# Patient Record
Sex: FEMALE | Race: WHITE | NOT HISPANIC OR LATINO | URBAN - METROPOLITAN AREA
[De-identification: names, ages, dates, MRNs, and addresses within clinical notes are randomized per-mention and may not be internally consistent; named-entity substitution may affect disease eponyms.]

---

## 2019-09-16 ENCOUNTER — EMERGENCY (EMERGENCY)
Facility: HOSPITAL | Age: 69
LOS: 1 days | Discharge: HOME | End: 2019-09-16
Attending: EMERGENCY MEDICINE | Admitting: EMERGENCY MEDICINE
Payer: MEDICARE

## 2019-09-16 VITALS
SYSTOLIC BLOOD PRESSURE: 139 MMHG | HEART RATE: 69 BPM | DIASTOLIC BLOOD PRESSURE: 84 MMHG | TEMPERATURE: 98 F | RESPIRATION RATE: 18 BRPM | OXYGEN SATURATION: 98 %

## 2019-09-16 VITALS
SYSTOLIC BLOOD PRESSURE: 121 MMHG | HEART RATE: 87 BPM | DIASTOLIC BLOOD PRESSURE: 85 MMHG | TEMPERATURE: 98 F | OXYGEN SATURATION: 98 % | RESPIRATION RATE: 16 BRPM

## 2019-09-16 PROCEDURE — 99283 EMERGENCY DEPT VISIT LOW MDM: CPT | Mod: 25

## 2019-09-16 PROCEDURE — 71101 X-RAY EXAM UNILAT RIBS/CHEST: CPT | Mod: 26,LT

## 2019-09-16 RX ORDER — TRAMADOL HYDROCHLORIDE 50 MG/1
50 TABLET ORAL ONCE
Refills: 0 | Status: DISCONTINUED | OUTPATIENT
Start: 2019-09-16 | End: 2019-09-16

## 2019-09-16 RX ORDER — TETANUS TOXOID, REDUCED DIPHTHERIA TOXOID AND ACELLULAR PERTUSSIS VACCINE, ADSORBED 5; 2.5; 8; 8; 2.5 [IU]/.5ML; [IU]/.5ML; UG/.5ML; UG/.5ML; UG/.5ML
0.5 SUSPENSION INTRAMUSCULAR ONCE
Refills: 0 | Status: COMPLETED | OUTPATIENT
Start: 2019-09-16 | End: 2019-09-16

## 2019-09-16 RX ORDER — IBUPROFEN 200 MG
600 TABLET ORAL ONCE
Refills: 0 | Status: COMPLETED | OUTPATIENT
Start: 2019-09-16 | End: 2019-09-16

## 2019-09-16 RX ORDER — IBUPROFEN 200 MG
1 TABLET ORAL
Qty: 20 | Refills: 0
Start: 2019-09-16 | End: 2019-09-20

## 2019-09-16 RX ORDER — TRAMADOL HYDROCHLORIDE 50 MG/1
1 TABLET ORAL
Qty: 9 | Refills: 0
Start: 2019-09-16 | End: 2019-09-18

## 2019-09-16 RX ADMIN — Medication 600 MILLIGRAM(S): at 13:49

## 2019-09-16 RX ADMIN — TETANUS TOXOID, REDUCED DIPHTHERIA TOXOID AND ACELLULAR PERTUSSIS VACCINE, ADSORBED 0.5 MILLILITER(S): 5; 2.5; 8; 8; 2.5 SUSPENSION INTRAMUSCULAR at 13:50

## 2019-09-16 RX ADMIN — TRAMADOL HYDROCHLORIDE 50 MILLIGRAM(S): 50 TABLET ORAL at 13:49

## 2019-09-16 NOTE — ED ADULT NURSE REASSESSMENT NOTE - NS ED NURSE REASSESS COMMENT FT1
Pt given incentive spirometer as instructed. pt education on use with verbal teach back from pt. Pt also demonstrated correct use of device at this time.

## 2019-09-16 NOTE — ED PROVIDER NOTE - RESPIRATORY, MLM
Breath sounds clear and equal bilaterally. anterior L chest wall tenderness, under breast tissue, no crepitus

## 2019-09-16 NOTE — ED PROVIDER NOTE - PATIENT PORTAL LINK FT
You can access the FollowMyHealth Patient Portal offered by [facility] by registering at the following website: [email or web address]. By joining [employer]’s FollowMyHealth portal, you will also be able to view your health information using other applications (apps) compatible with our system.

## 2019-09-16 NOTE — ED PROVIDER NOTE - OBJECTIVE STATEMENT
70 yo female pt, hx of HTN, hypothyroidism, allergic to sulfa and PNC. Presents after a fall on an uneven pavement. Was able to break her fall with her hands, has some abrasions to L knee and some pain in L anterior chest. States her bra underwire went into her skin. no abrasion/lac noted on exam. now has worsening pain in anterior chest wall w deep breaths. mild pain in knee and hands but normal ROM. last tetanus? no head trauma, no LOC, no neck pain, no back pain, no abd pain.

## 2019-09-16 NOTE — ED PROVIDER NOTE - CLINICAL SUMMARY MEDICAL DECISION MAKING FREE TEXT BOX
Pt with L anterior chest wall pain after fall, pain worse w deep breathing. Will R/O fx and PTX. Provide analgesia, treat clinically as chest wall contusion/rib fx w analgesia and incentive spirometry.

## 2019-09-16 NOTE — ED ADULT TRIAGE NOTE - CHIEF COMPLAINT QUOTE
patient walk in c/o left rib pain s/p fall outside [employer]; broke fall with hands and bruised left knee but really c/o pain to left side; pain with deep breathing; denies hitting head; denies blood thinners or daily ASA

## 2019-09-16 NOTE — ED ADULT NURSE NOTE - OBJECTIVE STATEMENT
68 y/o F c/o L sided rib pain after mechanical fall earlier today. Pn worse with breathing. Pt also c/o B/L hand and L knee pain. 68 y/o F c/o L sided rib pain after mechanical fall earlier today. Pn worse with breathing. Pt also c/o B/L hand and L knee pain. Pt denies hitting head, no visible trauma/no LOC. Pt denies PMH, denies blood thinner use

## 2019-09-16 NOTE — ED ADULT NURSE NOTE - CHPI ED NUR SYMPTOMS NEG
no fever/no numbness/no weakness/no tingling/no vomiting/no deformity/no loss of consciousness no fever/no numbness/no confusion/no vomiting/no tingling/no loss of consciousness/no weakness/no deformity

## 2019-09-16 NOTE — ED PROVIDER NOTE - NSFOLLOWUPINSTRUCTIONS_ED_ALL_ED_FT
TAKE PAIN MEDICATION AS NEEDED.    DO BREATHING EXERCISES WITH THE INCENTIVE SPIROMETER AS OFTEN AS POSSIBLE.     IF YOU DEVELOP WORSENING SHORTNESS OF BREATH, CHEST PAIN OR HAVE ANY OTHER WORSENING SYMPTOMS RETURN TO EMERGENCY ROOM FOR REEVALUATION.

## 2019-09-21 DIAGNOSIS — S60.512A ABRASION OF LEFT HAND, INITIAL ENCOUNTER: ICD-10-CM
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
## 2019-09-21 DIAGNOSIS — R07.89 OTHER CHEST PAIN: ICD-10-CM
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
## 2019-09-21 DIAGNOSIS — R07.81 PLEURODYNIA: ICD-10-CM

## 2019-09-21 DIAGNOSIS — Z23 ENCOUNTER FOR IMMUNIZATION: ICD-10-CM
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
## 2019-09-21 DIAGNOSIS — S80.212A ABRASION, LEFT KNEE, INITIAL ENCOUNTER: ICD-10-CM

## 2023-08-09 ENCOUNTER — INPATIENT (INPATIENT)
Facility: HOSPITAL | Age: 73
LOS: 2 days | Discharge: HOME | DRG: 522 | End: 2023-08-12
Attending: ORTHOPAEDIC SURGERY | Admitting: ORTHOPAEDIC SURGERY
Payer: MEDICARE

## 2023-08-09 VITALS
HEIGHT: 66 IN | DIASTOLIC BLOOD PRESSURE: 81 MMHG | TEMPERATURE: 98 F | WEIGHT: 115.08 LBS | HEART RATE: 90 BPM | SYSTOLIC BLOOD PRESSURE: 131 MMHG | RESPIRATION RATE: 18 BRPM | OXYGEN SATURATION: 96 %

## 2023-08-09 DIAGNOSIS — I10 ESSENTIAL (PRIMARY) HYPERTENSION: ICD-10-CM

## 2023-08-09 DIAGNOSIS — E03.9 HYPOTHYROIDISM, UNSPECIFIED: ICD-10-CM

## 2023-08-09 DIAGNOSIS — E78.5 HYPERLIPIDEMIA, UNSPECIFIED: ICD-10-CM

## 2023-08-09 DIAGNOSIS — F32.9 MAJOR DEPRESSIVE DISORDER, SINGLE EPISODE, UNSPECIFIED: ICD-10-CM

## 2023-08-09 DIAGNOSIS — S72.002A FRACTURE OF UNSPECIFIED PART OF NECK OF LEFT FEMUR, INITIAL ENCOUNTER FOR CLOSED FRACTURE: ICD-10-CM

## 2023-08-09 LAB
ALBUMIN SERPL ELPH-MCNC: 3.1 G/DL — LOW (ref 3.4–5)
ALP SERPL-CCNC: 132 U/L — HIGH (ref 40–120)
ALT FLD-CCNC: 28 U/L — SIGNIFICANT CHANGE UP (ref 12–42)
ANION GAP SERPL CALC-SCNC: 11 MMOL/L — SIGNIFICANT CHANGE UP (ref 9–16)
APTT BLD: 31.6 SEC — SIGNIFICANT CHANGE UP (ref 24.5–35.6)
AST SERPL-CCNC: 46 U/L — HIGH (ref 15–37)
BASOPHILS # BLD AUTO: 0.06 K/UL — SIGNIFICANT CHANGE UP (ref 0–0.2)
BASOPHILS NFR BLD AUTO: 0.8 % — SIGNIFICANT CHANGE UP (ref 0–2)
BILIRUB SERPL-MCNC: 0.5 MG/DL — SIGNIFICANT CHANGE UP (ref 0.2–1.2)
BUN SERPL-MCNC: 15 MG/DL — SIGNIFICANT CHANGE UP (ref 7–23)
CALCIUM SERPL-MCNC: 9.3 MG/DL — SIGNIFICANT CHANGE UP (ref 8.5–10.5)
CHLORIDE SERPL-SCNC: 98 MMOL/L — SIGNIFICANT CHANGE UP (ref 96–108)
CO2 SERPL-SCNC: 29 MMOL/L — SIGNIFICANT CHANGE UP (ref 22–31)
CREAT SERPL-MCNC: 0.48 MG/DL — LOW (ref 0.5–1.3)
EGFR: 101 ML/MIN/1.73M2 — SIGNIFICANT CHANGE UP
EOSINOPHIL # BLD AUTO: 0.03 K/UL — SIGNIFICANT CHANGE UP (ref 0–0.5)
EOSINOPHIL NFR BLD AUTO: 0.4 % — SIGNIFICANT CHANGE UP (ref 0–6)
GLUCOSE SERPL-MCNC: 97 MG/DL — SIGNIFICANT CHANGE UP (ref 70–99)
HCT VFR BLD CALC: 41.1 % — SIGNIFICANT CHANGE UP (ref 34.5–45)
HGB BLD-MCNC: 14 G/DL — SIGNIFICANT CHANGE UP (ref 11.5–15.5)
IMM GRANULOCYTES NFR BLD AUTO: 0.3 % — SIGNIFICANT CHANGE UP (ref 0–0.9)
INR BLD: 0.88 — SIGNIFICANT CHANGE UP (ref 0.85–1.18)
LYMPHOCYTES # BLD AUTO: 1.3 K/UL — SIGNIFICANT CHANGE UP (ref 1–3.3)
LYMPHOCYTES # BLD AUTO: 16.4 % — SIGNIFICANT CHANGE UP (ref 13–44)
MCHC RBC-ENTMCNC: 34.1 GM/DL — SIGNIFICANT CHANGE UP (ref 32–36)
MCHC RBC-ENTMCNC: 34.3 PG — HIGH (ref 27–34)
MCV RBC AUTO: 100.7 FL — HIGH (ref 80–100)
MONOCYTES # BLD AUTO: 0.72 K/UL — SIGNIFICANT CHANGE UP (ref 0–0.9)
MONOCYTES NFR BLD AUTO: 9.1 % — SIGNIFICANT CHANGE UP (ref 2–14)
NEUTROPHILS # BLD AUTO: 5.79 K/UL — SIGNIFICANT CHANGE UP (ref 1.8–7.4)
NEUTROPHILS NFR BLD AUTO: 73 % — SIGNIFICANT CHANGE UP (ref 43–77)
NRBC # BLD: 0 /100 WBCS — SIGNIFICANT CHANGE UP (ref 0–0)
PLATELET # BLD AUTO: 333 K/UL — SIGNIFICANT CHANGE UP (ref 150–400)
POTASSIUM SERPL-MCNC: 3 MMOL/L — LOW (ref 3.5–5.3)
POTASSIUM SERPL-SCNC: 3 MMOL/L — LOW (ref 3.5–5.3)
PROT SERPL-MCNC: 7 G/DL — SIGNIFICANT CHANGE UP (ref 6.4–8.2)
PROTHROM AB SERPL-ACNC: 10 SEC — SIGNIFICANT CHANGE UP (ref 9.5–13)
RBC # BLD: 4.08 M/UL — SIGNIFICANT CHANGE UP (ref 3.8–5.2)
RBC # FLD: 12.1 % — SIGNIFICANT CHANGE UP (ref 10.3–14.5)
SARS-COV-2 RNA SPEC QL NAA+PROBE: SIGNIFICANT CHANGE UP
SODIUM SERPL-SCNC: 138 MMOL/L — SIGNIFICANT CHANGE UP (ref 132–145)
WBC # BLD: 7.92 K/UL — SIGNIFICANT CHANGE UP (ref 3.8–10.5)
WBC # FLD AUTO: 7.92 K/UL — SIGNIFICANT CHANGE UP (ref 3.8–10.5)

## 2023-08-09 PROCEDURE — 73502 X-RAY EXAM HIP UNI 2-3 VIEWS: CPT | Mod: 26,LT

## 2023-08-09 PROCEDURE — 71045 X-RAY EXAM CHEST 1 VIEW: CPT | Mod: 26

## 2023-08-09 PROCEDURE — 99285 EMERGENCY DEPT VISIT HI MDM: CPT

## 2023-08-09 PROCEDURE — 73700 CT LOWER EXTREMITY W/O DYE: CPT | Mod: 26,LT

## 2023-08-09 PROCEDURE — 76856 US EXAM PELVIC COMPLETE: CPT | Mod: 26

## 2023-08-09 RX ORDER — POVIDONE-IODINE 5 %
1 AEROSOL (ML) TOPICAL ONCE
Refills: 0 | Status: DISCONTINUED | OUTPATIENT
Start: 2023-08-09 | End: 2023-08-11

## 2023-08-09 RX ORDER — ATORVASTATIN CALCIUM 80 MG/1
20 TABLET, FILM COATED ORAL AT BEDTIME
Refills: 0 | Status: DISCONTINUED | OUTPATIENT
Start: 2023-08-09 | End: 2023-08-11

## 2023-08-09 RX ORDER — ZOLPIDEM TARTRATE 10 MG/1
1 TABLET ORAL
Refills: 0 | DISCHARGE

## 2023-08-09 RX ORDER — LEVOTHYROXINE SODIUM 125 MCG
50 TABLET ORAL DAILY
Refills: 0 | Status: DISCONTINUED | OUTPATIENT
Start: 2023-08-09 | End: 2023-08-11

## 2023-08-09 RX ORDER — ZALEPLON 10 MG
5 CAPSULE ORAL AT BEDTIME
Refills: 0 | Status: DISCONTINUED | OUTPATIENT
Start: 2023-08-09 | End: 2023-08-11

## 2023-08-09 RX ORDER — SENNA PLUS 8.6 MG/1
2 TABLET ORAL AT BEDTIME
Refills: 0 | Status: DISCONTINUED | OUTPATIENT
Start: 2023-08-09 | End: 2023-08-11

## 2023-08-09 RX ORDER — LEVETIRACETAM 250 MG/1
1 TABLET, FILM COATED ORAL
Refills: 0 | DISCHARGE

## 2023-08-09 RX ORDER — GABAPENTIN 400 MG/1
1 CAPSULE ORAL
Refills: 0 | DISCHARGE

## 2023-08-09 RX ORDER — AMLODIPINE BESYLATE 2.5 MG/1
1 TABLET ORAL
Refills: 0 | DISCHARGE

## 2023-08-09 RX ORDER — HYDROMORPHONE HYDROCHLORIDE 2 MG/ML
0.5 INJECTION INTRAMUSCULAR; INTRAVENOUS; SUBCUTANEOUS EVERY 6 HOURS
Refills: 0 | Status: DISCONTINUED | OUTPATIENT
Start: 2023-08-09 | End: 2023-08-11

## 2023-08-09 RX ORDER — LEVETIRACETAM 250 MG/1
250 TABLET, FILM COATED ORAL
Refills: 0 | Status: DISCONTINUED | OUTPATIENT
Start: 2023-08-09 | End: 2023-08-10

## 2023-08-09 RX ORDER — OXYCODONE HYDROCHLORIDE 5 MG/1
10 TABLET ORAL EVERY 4 HOURS
Refills: 0 | Status: DISCONTINUED | OUTPATIENT
Start: 2023-08-09 | End: 2023-08-11

## 2023-08-09 RX ORDER — SERTRALINE 25 MG/1
1 TABLET, FILM COATED ORAL
Refills: 0 | DISCHARGE

## 2023-08-09 RX ORDER — ROSUVASTATIN CALCIUM 5 MG/1
1 TABLET ORAL
Refills: 0 | DISCHARGE

## 2023-08-09 RX ORDER — OXYCODONE HYDROCHLORIDE 5 MG/1
5 TABLET ORAL EVERY 4 HOURS
Refills: 0 | Status: DISCONTINUED | OUTPATIENT
Start: 2023-08-09 | End: 2023-08-11

## 2023-08-09 RX ORDER — SODIUM CHLORIDE 9 MG/ML
500 INJECTION INTRAMUSCULAR; INTRAVENOUS; SUBCUTANEOUS ONCE
Refills: 0 | Status: COMPLETED | OUTPATIENT
Start: 2023-08-09 | End: 2023-08-09

## 2023-08-09 RX ORDER — POLYETHYLENE GLYCOL 3350 17 G/17G
17 POWDER, FOR SOLUTION ORAL DAILY
Refills: 0 | Status: DISCONTINUED | OUTPATIENT
Start: 2023-08-09 | End: 2023-08-11

## 2023-08-09 RX ORDER — CHLORHEXIDINE GLUCONATE 213 G/1000ML
1 SOLUTION TOPICAL ONCE
Refills: 0 | Status: DISCONTINUED | OUTPATIENT
Start: 2023-08-09 | End: 2023-08-11

## 2023-08-09 RX ORDER — SERTRALINE 25 MG/1
25 TABLET, FILM COATED ORAL DAILY
Refills: 0 | Status: DISCONTINUED | OUTPATIENT
Start: 2023-08-09 | End: 2023-08-10

## 2023-08-09 RX ORDER — ENOXAPARIN SODIUM 100 MG/ML
30 INJECTION SUBCUTANEOUS ONCE
Refills: 0 | Status: COMPLETED | OUTPATIENT
Start: 2023-08-09 | End: 2023-08-09

## 2023-08-09 RX ORDER — SODIUM CHLORIDE 9 MG/ML
1000 INJECTION INTRAMUSCULAR; INTRAVENOUS; SUBCUTANEOUS
Refills: 0 | Status: DISCONTINUED | OUTPATIENT
Start: 2023-08-09 | End: 2023-08-11

## 2023-08-09 RX ORDER — PANTOPRAZOLE SODIUM 20 MG/1
40 TABLET, DELAYED RELEASE ORAL
Refills: 0 | Status: DISCONTINUED | OUTPATIENT
Start: 2023-08-09 | End: 2023-08-11

## 2023-08-09 RX ORDER — AMLODIPINE BESYLATE 2.5 MG/1
5 TABLET ORAL DAILY
Refills: 0 | Status: DISCONTINUED | OUTPATIENT
Start: 2023-08-09 | End: 2023-08-11

## 2023-08-09 RX ORDER — GABAPENTIN 400 MG/1
300 CAPSULE ORAL DAILY
Refills: 0 | Status: DISCONTINUED | OUTPATIENT
Start: 2023-08-09 | End: 2023-08-11

## 2023-08-09 RX ORDER — ACETAMINOPHEN 500 MG
650 TABLET ORAL ONCE
Refills: 0 | Status: COMPLETED | OUTPATIENT
Start: 2023-08-09 | End: 2023-08-09

## 2023-08-09 RX ADMIN — Medication 650 MILLIGRAM(S): at 12:09

## 2023-08-09 RX ADMIN — Medication 5 MILLIGRAM(S): at 23:13

## 2023-08-09 RX ADMIN — OXYCODONE HYDROCHLORIDE 5 MILLIGRAM(S): 5 TABLET ORAL at 23:10

## 2023-08-09 RX ADMIN — ENOXAPARIN SODIUM 30 MILLIGRAM(S): 100 INJECTION SUBCUTANEOUS at 22:35

## 2023-08-09 RX ADMIN — OXYCODONE HYDROCHLORIDE 5 MILLIGRAM(S): 5 TABLET ORAL at 22:35

## 2023-08-09 NOTE — ED PROVIDER NOTE - PHYSICAL EXAMINATION
VITAL SIGNS: I have reviewed nursing notes and confirm.  CONST: Well-appearing; No apparent distress.  ENT: No nasal discharge; airway clear.  EYES: Sclera clear. Pupils round and symmetrical bilaterally.  RESP: Breathing comfortably; speaking in full sentences.   MSK: +Some tenderness of the left hip joint but pelvis is stable. Decreased ROM of left hip.    NEURO: Alert, oriented. Speech is fluent and appropriate. Moving all extremities appropriately. No gross motor or sensory abnormalities.  SKIN: Skin is normal temperature; no diaphoresis; no pallor.  PSYCH: Cooperative. Appropriate mood, language, and behavior.

## 2023-08-09 NOTE — ED ADULT NURSE NOTE - NSFALLRISKINTERV_ED_ALL_ED

## 2023-08-09 NOTE — CONSULT NOTE ADULT - ASSESSMENT
72yo F w/ PMH HTN, HLD, depression, hypothyroidism, seizure hx?, alcohol use (1 drink a night) who presented for L hip pain and known L femoral fracture. Admitted to Orthopedic service for Closed reduction percutaneous pinning vs Total hip arthroplasty.  Internal Medicine consulted for preoperative assessment.        ***INCOMPLETE***    #preop assessment  RCRI 0 points (class 1)  Marroquin score 0.3%  admission ECG: left axis deviation, anterior lead T wave inversions.  No hx of chest pain, TALLEY. No hx of SURI.  METS prior to hip pain >4. Over last several months, activity significantly limited due to pain.  No past allergies to anesthetics.  Hx of 3 falls since March. Had EEG study; pt not sure if seizure actually seen but her PCP (Reshma Lorenzo at [facility]) reportedly thought it warranted starting keppra which patient has no yet started.  -        ***INCOMPLETE*** 72yo F w/ PMH HTN, HLD, depression, hypothyroidism, seizure hx?, alcohol use (1 drink a night) who presented for L hip pain and known L femoral fracture. Admitted to Orthopedic service for Closed reduction percutaneous pinning vs Total hip arthroplasty.  Internal Medicine consulted for preoperative assessment.      #preop assessment  RCRI 0 points (class 1)  Marroquin score 0.3%  admission ECG: left axis deviation, anterior lead T wave inversions.  No hx of chest pain, TALLEY. No hx of SURI.  METS prior to hip pain >4. Over last several months, activity significantly limited due to hip pain.  No past allergies to anesthetics.  Hx of 3 falls since March. PCP thinks 1 fall due to seizure but not concerned for cardiogenic syncope. Had EEG study; pt not sure if seizure actually seen but her PCP (Reshma Lorenzo at [place]) reportedly thought it warranted starting keppra which patient has no yet started.  Recs:  - pt is of low perioperative cardiac risk for low risk procedure  - no further interventions needed prior to procedure        Discussed with Dr. Pinto.  Not final until signed by Attending.

## 2023-08-09 NOTE — ED PROVIDER NOTE - OBJECTIVE STATEMENT
72 y/o F with PMHx of arthritis diagnosed 6 months ago and being considered for left hip replacement, states she fell in her house 2 nights ago and now complaining of left hip pain and unable to bear weight. She reports she has had 3 falls in the last 4 months and has seen a neurologist for this who did an EEG and she will be starting a new medication today. She takes Tylenol daily for arthritis. No head strike or LOC.

## 2023-08-09 NOTE — H&P ADULT - HISTORY OF PRESENT ILLNESS
Orthopaedic Surgery Consult Note    For Surgeon: Dr. Garcia    HPI:  73yFemale with PMH of hypothyroidism, depression, hld, htn, pos seizures  presents with L hip pain after mechanical fall. Was immediately unable to bear weight on that limb. In ED, x-rays showed Garden I femoral neck fracture. Denies any numbness or tingling. Denies injury elswhere.    States she has had long standing anterior groin pain since february follows with Dr. hernandez at [facility] for l hip OA    HPI:      Vital Signs Last 24 Hrs  T(C): 36.3 (09 Aug 2023 20:14), Max: 36.9 (09 Aug 2023 13:21)  T(F): 97.4 (09 Aug 2023 20:14), Max: 98.4 (09 Aug 2023 13:21)  HR: 81 (09 Aug 2023 20:14) (81 - 90)  BP: 137/80 (09 Aug 2023 20:14) (131/81 - 149/82)  BP(mean): --  RR: 17 (09 Aug 2023 20:14) (16 - 18)  SpO2: 96% (09 Aug 2023 20:14) (95% - 96%)    Parameters below as of 09 Aug 2023 20:14  Patient On (Oxygen Delivery Method): room air        Physical Exam:  General: NAD, resting comfortably in bed  [L]LE:  Skin intact, no ecchymosis or wounds. Limb shortened and externally rotated.  5/5 EHL/FHL/TA/GS  SILT over distal limb  DP/PT palpable    Imaging:  X-ray [L] hip - Garden [i] femoral neck fracture, nondisplaced, posteriot ilt <5, valgus impacted, underlying OA w/ some AVN

## 2023-08-09 NOTE — ED ADULT NURSE NOTE - OBJECTIVE STATEMENT
Patient presents with complaints of worsening left hip pain and difficulty ambulating s/p fall on Sunday night. Patient unable to bear weight afterward. Has been using wheelchair since then. H/o multiple falls (3 falls within 4 months) and arthritis, planning for hip replacement. Didn't hit her head during fall.

## 2023-08-09 NOTE — ED PROVIDER NOTE - PROGRESS NOTE DETAILS
Imaging shows L hip fracture.  Pt does NOT want to stay in the hospital because she wants to get a hip replacement as an outpatient.  Discussed high morbidity and mortality associated with delayed repair of hip fracture with pt.  Discussed w pt's orthopedist Dr. Noel at [facility] who agrees pt needs to stay for repair.    Imaging also shows large R adnexal cyst, of which pt is aware and has plans to get an US via her OBGYN.  Will get pelvic US here- only transabdominal as pt will be unable to place feet in stirrups due to hip fracture. Discussed w pt's PMD Dr. Lorenzo ([phone]) who agrees w plan for pt to stay for hip fracture repair.  Plan to admit to ortho at .  Pt amenable to plan.

## 2023-08-09 NOTE — CONSULT NOTE ADULT - SUBJECTIVE AND OBJECTIVE BOX
INTERNAL MEDICINE - INITIAL CONSULT NOTE    MARIA ESTHER BARRERA  [id number]    Patient is a 73y old  Female who presents with a chief complaint of     HPI:  Orthopaedic Surgery Consult Note    For Surgeon: Dr. Garcia    HPI:  73yFemale with PMH of hypothyroidism, depression, hld, htn, pos seizures  presents with L hip pain after mechanical fall. Was immediately unable to bear weight on that limb. In ED, x-rays showed Garden I femoral neck fracture. Denies any numbness or tingling. Denies injury elswhere.    States she has had long standing anterior groin pain since february [facility]    HPI:      Vital Signs Last 24 Hrs  T(C): 36.3 (09 Aug 2023 20:14), Max: 36.9 (09 Aug 2023 13:21)  T(F): 97.4 (09 Aug 2023 20:14), Max: 98.4 (09 Aug 2023 13:21)  HR: 81 (09 Aug 2023 20:14) (81 - 90)  BP: 137/80 (09 Aug 2023 20:14) (131/81 - 149/82)  BP(mean): --  RR: 17 (09 Aug 2023 20:14) (16 - 18)  SpO2: 96% (09 Aug 2023 20:14) (95% - 96%)    Parameters below as of 09 Aug 2023 20:14  Patient On (Oxygen Delivery Method): room air        Physical Exam:  General: NAD, resting comfortably in bed  [L]LE:  Skin intact, no ecchymosis or wounds. Limb shortened and externally rotated.  5/5 EHL/FHL/TA/GS  SILT over distal limb  DP/PT palpable    Imaging:  X-ray [L] hip - Garden [i] femoral neck fracture, nondisplaced, posteriot ilt <5, valgus impacted, underlying OA w/ some AVN   (09 Aug 2023 21:21)      PAST MEDICAL & SURGICAL HISTORY:  Arthritis      Hypothyroidism      Hypertension      Hyperlipidemia      Major depression          amLODIPine   Tablet 5 milliGRAM(s) Oral daily  atorvastatin 20 milliGRAM(s) Oral at bedtime  chlorhexidine 2% Cloths 1 Application(s) Topical once  gabapentin 300 milliGRAM(s) Oral daily  HYDROmorphone  Injectable 0.5 milliGRAM(s) IV Push every 6 hours PRN  levETIRAcetam 250 milliGRAM(s) Oral two times a day  levothyroxine 50 MICROGram(s) Oral daily  multivitamin 1 Tablet(s) Oral daily  oxyCODONE    IR 5 milliGRAM(s) Oral every 4 hours PRN  oxyCODONE    IR 10 milliGRAM(s) Oral every 4 hours PRN  pantoprazole    Tablet 40 milliGRAM(s) Oral before breakfast  polyethylene glycol 3350 17 Gram(s) Oral daily  povidone iodine 5% Nasal Swab 1 Application(s) Both Nostrils once  senna 2 Tablet(s) Oral at bedtime  sertraline 25 milliGRAM(s) Oral daily  sodium chloride 0.9%. 1000 milliLiter(s) IV Continuous <Continuous>  zaleplon 5 milliGRAM(s) Oral at bedtime PRN      FAMILY HISTORY:      REVIEW OF SYSTEMS:  CONSTITUTIONAL: No weakness, fever, or chills  EYES: No eye pain or discharge  ENMT:  No sinus or throat pain  NECK: No pain or stiffness  RESPIRATORY: No cough, wheezing, chills or hemoptysis; No shortness of breath  CARDIOVASCULAR: No chest pain, palpitations, dizziness, or leg swelling  GASTROINTESTINAL: No abdominal or epigastric pain. No nausea, vomiting, or hematemesis; No diarrhea or constipation. No melena or hematochezia.  GENITOURINARY: No dysuria or incontinence  NEUROLOGICAL: No headaches, memory loss, loss of strength, numbness, or tremors  SKIN: No new rashes  MUSCULOSKELETAL: No joint pain or swelling; No muscle, back, or extremity pain  HEME/LYMPH: No easy bruising, or bleeding gums      PHYSICAL EXAM:  T(C): 36.3 (08-09-23 @ 20:14), Max: 36.9 (08-09-23 @ 13:21)  HR: 81 (08-09-23 @ 20:14) (81 - 90)  BP: 137/80 (08-09-23 @ 20:14) (131/81 - 149/82)  RR: 17 (08-09-23 @ 20:14) (16 - 18)  SpO2: 96% (08-09-23 @ 20:14) (95% - 96%)    General: NAD, laying in bed, speaking in full sentences  HEENT: head NC/AT, no conjunctival injection, EOMI, MMM  Neck: supple, no JVD  Cardio: RRR, +S1/S2, no M/R/G  Resp: lungs CTAB, no cough/wheezes/rales/rhonchi  Abdo: soft, NT, ND, +bowel sounds x4, no organomegaly or palpable mass    Extremities: WWP, no edema/cyanosis/clubbing   Vasc: 2+ radial and DP pulses b/l  Neuro: A&Ox3  Psych: speech non-pressured, thoughts goal-oriented  Skin: dry, intact, no visible jaundice   MSK: no joint swelling      Consultant(s) Notes Reviewed:  [x ] YES  [ ] NO  Care Discussed with Consultants/Other Providers [ x] YES  [ ] NO    LABS:          RADIOLOGY & ADDITIONAL TESTS:    Imaging Personally Reviewed:  [X] YES  [ ] NO INTERNAL MEDICINE - INITIAL CONSULT NOTE    HOLLYMARIA ESTHER  [id number]    Patient is a 73y old  Female who presents with a chief complaint of     HPI:  Orthopaedic Surgery Consult Note    For Surgeon: Dr. Garcia    HPI:  73yFemale with PMH of hypothyroidism, depression, hld, htn, pos seizures  presents with L hip pain after mechanical fall. Was immediately unable to bear weight on that limb. In ED, x-rays showed Garden I femoral neck fracture. Denies any numbness or tingling. Denies injury elswhere.    States she has had long standing anterior groin pain since february follows with Dr. hernandez at \[facility]\"" for l hip OA          Imaging:  X-ray [L] hip - Garden [i] femoral neck fracture, nondisplaced, posteriot ilt <5, valgus impacted, underlying OA w/ some AVN   (09 Aug 2023 21:21)      PAST MEDICAL & SURGICAL HISTORY:  Arthritis      Hypothyroidism      Hypertension      Hyperlipidemia      Major depression          amLODIPine   Tablet 5 milliGRAM(s) Oral daily  atorvastatin 20 milliGRAM(s) Oral at bedtime  chlorhexidine 2% Cloths 1 Application(s) Topical once  gabapentin 300 milliGRAM(s) Oral daily  HYDROmorphone  Injectable 0.5 milliGRAM(s) IV Push every 6 hours PRN  levETIRAcetam 250 milliGRAM(s) Oral two times a day  levothyroxine 50 MICROGram(s) Oral daily  multivitamin 1 Tablet(s) Oral daily  oxyCODONE    IR 5 milliGRAM(s) Oral every 4 hours PRN  oxyCODONE    IR 10 milliGRAM(s) Oral every 4 hours PRN  pantoprazole    Tablet 40 milliGRAM(s) Oral before breakfast  polyethylene glycol 3350 17 Gram(s) Oral daily  povidone iodine 5% Nasal Swab 1 Application(s) Both Nostrils once  senna 2 Tablet(s) Oral at bedtime  sertraline 25 milliGRAM(s) Oral daily  sodium chloride 0.9%. 1000 milliLiter(s) IV Continuous <Continuous>  zaleplon 5 milliGRAM(s) Oral at bedtime PRN      FAMILY HISTORY:      REVIEW OF SYSTEMS:  CONSTITUTIONAL: No weakness, fever, or chills  EYES: No eye pain or discharge  ENMT:  No sinus or throat pain  NECK: No pain or stiffness  RESPIRATORY: No cough, wheezing, chills or hemoptysis; No shortness of breath  CARDIOVASCULAR: No chest pain, palpitations, dizziness, or leg swelling  GASTROINTESTINAL: No abdominal or epigastric pain. No nausea, vomiting, or hematemesis; No diarrhea or constipation. No melena or hematochezia.  GENITOURINARY: No dysuria or incontinence  NEUROLOGICAL: No headaches, memory loss, loss of strength, numbness, or tremors  SKIN: No new rashes  MUSCULOSKELETAL: No joint pain or swelling; No muscle, back, or extremity pain  HEME/LYMPH: No easy bruising, or bleeding gums      PHYSICAL EXAM:  T(C): 36.3 (08-09-23 @ 20:14), Max: 36.9 (08-09-23 @ 13:21)  HR: 81 (08-09-23 @ 20:14) (81 - 90)  BP: 137/80 (08-09-23 @ 20:14) (131/81 - 149/82)  RR: 17 (08-09-23 @ 20:14) (16 - 18)  SpO2: 96% (08-09-23 @ 20:14) (95% - 96%)    General: NAD, sitting in bed  HEENT: head NC/AT, no conjunctival injection, EOMI, MMM  Neck: supple, no JVD  Cardio: RRR, +S1/S2, no M/R/G  Resp: lungs CTAB, no cough/wheezes/rales/rhonchi  Abdo: soft, NT, ND, +bowel sounds x4, no organomegaly or palpable mass    Extremities: warm and dry, no edema/cyanosis/clubbing   Vasc: 2+ radial and 1+ DP pulses b/l  Neuro: A&Ox3, decreased ROM L leg  Psych: calm, appropriate  Skin: dry, intact  MSK: no joint swelling      Consultant(s) Notes Reviewed:  [x ] YES  [ ] NO  Care Discussed with Consultants/Other Providers [ x] YES  [ ] NO    LABS:          RADIOLOGY & ADDITIONAL TESTS:    Imaging Personally Reviewed:  [X] YES  [ ] NO

## 2023-08-09 NOTE — H&P ADULT - ASSESSMENT
A/P: 73yFemale presents with [L] hip femoral neckfracture following mechanical fall.  -Added on to OR schedule for [CRPP vs GEOFFREY]  -Consented for [x] hip pinning vs. hemiarthroplasty vs. total hip arthroplasty  -preop  -med clearance  -strict bedrest  -NPO midnight/IVF  -Medicine consulted for pre-op clearance  -Pre-op workup- CBC, BMP, PT/PTT/INR, T&S, UA, EKG, CXR, COVID  -Obtain low AP pelvis film  -Pain control  -NPO/IVF  -Discussed with  []    Ortho Pager [phone]

## 2023-08-09 NOTE — ED ADULT TRIAGE NOTE - CHIEF COMPLAINT QUOTE
Pt comes in via wheelchair c/o L hip pain s/p mechanical fall. Pt is supposed to have L hip replacement in near future.

## 2023-08-09 NOTE — ED PROVIDER NOTE - CLINICAL SUMMARY MEDICAL DECISION MAKING FREE TEXT BOX
Patient with Hx of arthritis and currently being worked up for frequent falls by neurology presents with left hip pain after fall 2 days ago with difficulty bearing weight. Patient has tenderness of left hip with decreased range of motion on exam. Differential includes exacerbation of arthritis pain versus fracture. Plan for x-ray, analgesia, and reassess.

## 2023-08-09 NOTE — H&P ADULT - NSICDXPASTMEDICALHX_GEN_ALL_CORE_FT
PAST MEDICAL HISTORY:  Arthritis     Hyperlipidemia     Hypertension     Hypothyroidism     Major depression

## 2023-08-10 LAB
ANION GAP SERPL CALC-SCNC: 12 MMOL/L — SIGNIFICANT CHANGE UP (ref 5–17)
APPEARANCE UR: CLEAR — SIGNIFICANT CHANGE UP
BACTERIA # UR AUTO: PRESENT /HPF
BILIRUB UR-MCNC: ABNORMAL
BLD GP AB SCN SERPL QL: NEGATIVE — SIGNIFICANT CHANGE UP
BLD GP AB SCN SERPL QL: NEGATIVE — SIGNIFICANT CHANGE UP
BUN SERPL-MCNC: 6 MG/DL — LOW (ref 7–23)
CALCIUM SERPL-MCNC: 8.4 MG/DL — SIGNIFICANT CHANGE UP (ref 8.4–10.5)
CALCIUM SERPL-MCNC: 8.6 MG/DL — SIGNIFICANT CHANGE UP (ref 8.4–10.5)
CHLORIDE SERPL-SCNC: 96 MMOL/L — SIGNIFICANT CHANGE UP (ref 96–108)
CHLORIDE SERPL-SCNC: 98 MMOL/L — SIGNIFICANT CHANGE UP (ref 96–108)
CO2 SERPL-SCNC: 24 MMOL/L — SIGNIFICANT CHANGE UP (ref 22–31)
CO2 SERPL-SCNC: 26 MMOL/L — SIGNIFICANT CHANGE UP (ref 22–31)
COLOR SPEC: YELLOW — SIGNIFICANT CHANGE UP
COMMENT - URINE: SIGNIFICANT CHANGE UP
CREAT SERPL-MCNC: 0.38 MG/DL — LOW (ref 0.5–1.3)
DIFF PNL FLD: ABNORMAL
EGFR: 107 ML/MIN/1.73M2 — SIGNIFICANT CHANGE UP
EPI CELLS # UR: ABNORMAL /HPF (ref 0–5)
GLUCOSE SERPL-MCNC: 88 MG/DL — SIGNIFICANT CHANGE UP (ref 70–99)
GLUCOSE SERPL-MCNC: 98 MG/DL — SIGNIFICANT CHANGE UP (ref 70–99)
GLUCOSE UR QL: NEGATIVE — SIGNIFICANT CHANGE UP
HCT VFR BLD CALC: 37 % — SIGNIFICANT CHANGE UP (ref 34.5–45)
HCV AB S/CO SERPL IA: 0.04 S/CO — SIGNIFICANT CHANGE UP
HCV AB SERPL-IMP: SIGNIFICANT CHANGE UP
HGB BLD-MCNC: 12.7 G/DL — SIGNIFICANT CHANGE UP (ref 11.5–15.5)
KETONES UR-MCNC: >=80 MG/DL
LEUKOCYTE ESTERASE UR-ACNC: NEGATIVE — SIGNIFICANT CHANGE UP
MAGNESIUM SERPL-MCNC: 1.8 MG/DL — SIGNIFICANT CHANGE UP (ref 1.6–2.6)
MCHC RBC-ENTMCNC: 34.3 GM/DL — SIGNIFICANT CHANGE UP (ref 32–36)
MCHC RBC-ENTMCNC: 35 PG — HIGH (ref 27–34)
MCV RBC AUTO: 101.9 FL — HIGH (ref 80–100)
NITRITE UR-MCNC: NEGATIVE — SIGNIFICANT CHANGE UP
NRBC # BLD: 0 /100 WBCS — SIGNIFICANT CHANGE UP (ref 0–0)
PH UR: 6 — SIGNIFICANT CHANGE UP (ref 5–8)
PLATELET # BLD AUTO: 290 K/UL — SIGNIFICANT CHANGE UP (ref 150–400)
POTASSIUM SERPL-MCNC: 2.7 MMOL/L — CRITICAL LOW (ref 3.5–5.3)
POTASSIUM SERPL-SCNC: 2.7 MMOL/L — CRITICAL LOW (ref 3.5–5.3)
PROT UR-MCNC: ABNORMAL MG/DL
RBC # BLD: 3.63 M/UL — LOW (ref 3.8–5.2)
RBC # FLD: 12 % — SIGNIFICANT CHANGE UP (ref 10.3–14.5)
RBC CASTS # UR COMP ASSIST: < 5 /HPF — SIGNIFICANT CHANGE UP
RH IG SCN BLD-IMP: POSITIVE — SIGNIFICANT CHANGE UP
RH IG SCN BLD-IMP: POSITIVE — SIGNIFICANT CHANGE UP
SODIUM SERPL-SCNC: 134 MMOL/L — LOW (ref 135–145)
SP GR SPEC: 1.02 — SIGNIFICANT CHANGE UP (ref 1–1.03)
T4 AB SER-ACNC: 6.87 UG/DL — SIGNIFICANT CHANGE UP (ref 4.5–11.7)
TSH SERPL-MCNC: 5.24 UIU/ML — HIGH (ref 0.27–4.2)
UROBILINOGEN FLD QL: 0.2 E.U./DL — SIGNIFICANT CHANGE UP
WBC # BLD: 6.84 K/UL — SIGNIFICANT CHANGE UP (ref 3.8–10.5)
WBC # FLD AUTO: 6.84 K/UL — SIGNIFICANT CHANGE UP (ref 3.8–10.5)
WBC UR QL: < 5 /HPF — SIGNIFICANT CHANGE UP

## 2023-08-10 PROCEDURE — 72170 X-RAY EXAM OF PELVIS: CPT | Mod: 26

## 2023-08-10 PROCEDURE — 99233 SBSQ HOSP IP/OBS HIGH 50: CPT

## 2023-08-10 RX ORDER — LEVETIRACETAM 250 MG/1
750 TABLET, FILM COATED ORAL
Refills: 0 | Status: DISCONTINUED | OUTPATIENT
Start: 2023-08-10 | End: 2023-08-11

## 2023-08-10 RX ORDER — POTASSIUM CHLORIDE 20 MEQ
20 PACKET (EA) ORAL
Refills: 0 | Status: COMPLETED | OUTPATIENT
Start: 2023-08-10 | End: 2023-08-10

## 2023-08-10 RX ORDER — ZALEPLON 10 MG
5 CAPSULE ORAL AT BEDTIME
Refills: 0 | Status: DISCONTINUED | OUTPATIENT
Start: 2023-08-10 | End: 2023-08-11

## 2023-08-10 RX ORDER — ENOXAPARIN SODIUM 100 MG/ML
40 INJECTION SUBCUTANEOUS ONCE
Refills: 0 | Status: COMPLETED | OUTPATIENT
Start: 2023-08-10 | End: 2023-08-10

## 2023-08-10 RX ORDER — LEVETIRACETAM 250 MG/1
1500 TABLET, FILM COATED ORAL ONCE
Refills: 0 | Status: COMPLETED | OUTPATIENT
Start: 2023-08-10 | End: 2023-08-10

## 2023-08-10 RX ADMIN — POLYETHYLENE GLYCOL 3350 17 GRAM(S): 17 POWDER, FOR SOLUTION ORAL at 11:04

## 2023-08-10 RX ADMIN — LEVETIRACETAM 750 MILLIGRAM(S): 250 TABLET, FILM COATED ORAL at 22:38

## 2023-08-10 RX ADMIN — ENOXAPARIN SODIUM 40 MILLIGRAM(S): 100 INJECTION SUBCUTANEOUS at 14:15

## 2023-08-10 RX ADMIN — Medication 1 TABLET(S): at 11:04

## 2023-08-10 RX ADMIN — Medication 50 MILLIEQUIVALENT(S): at 17:53

## 2023-08-10 RX ADMIN — HYDROMORPHONE HYDROCHLORIDE 0.5 MILLIGRAM(S): 2 INJECTION INTRAMUSCULAR; INTRAVENOUS; SUBCUTANEOUS at 00:18

## 2023-08-10 RX ADMIN — SODIUM CHLORIDE 90 MILLILITER(S): 9 INJECTION INTRAMUSCULAR; INTRAVENOUS; SUBCUTANEOUS at 05:23

## 2023-08-10 RX ADMIN — Medication 5 MILLIGRAM(S): at 22:38

## 2023-08-10 RX ADMIN — OXYCODONE HYDROCHLORIDE 10 MILLIGRAM(S): 5 TABLET ORAL at 07:20

## 2023-08-10 RX ADMIN — Medication 50 MILLIEQUIVALENT(S): at 12:09

## 2023-08-10 RX ADMIN — HYDROMORPHONE HYDROCHLORIDE 0.5 MILLIGRAM(S): 2 INJECTION INTRAMUSCULAR; INTRAVENOUS; SUBCUTANEOUS at 01:00

## 2023-08-10 RX ADMIN — SODIUM CHLORIDE 90 MILLILITER(S): 9 INJECTION INTRAMUSCULAR; INTRAVENOUS; SUBCUTANEOUS at 12:09

## 2023-08-10 RX ADMIN — Medication 50 MILLIEQUIVALENT(S): at 15:00

## 2023-08-10 RX ADMIN — Medication 50 MICROGRAM(S): at 05:23

## 2023-08-10 RX ADMIN — OXYCODONE HYDROCHLORIDE 10 MILLIGRAM(S): 5 TABLET ORAL at 06:47

## 2023-08-10 RX ADMIN — Medication 5 MILLIGRAM(S): at 02:53

## 2023-08-10 RX ADMIN — LEVETIRACETAM 400 MILLIGRAM(S): 250 TABLET, FILM COATED ORAL at 14:33

## 2023-08-10 NOTE — PROGRESS NOTE ADULT - SUBJECTIVE AND OBJECTIVE BOX
Ortho Note    Pt comfortable without complaints, pain controlled  Denies CP, SOB, N/V, new numbness/tingling     Vital Signs Last 24 Hrs  T(C): 36.8 (08-10-23 @ 04:52), Max: 36.8 (08-10-23 @ 04:52)  T(F): 98.3 (08-10-23 @ 04:52), Max: 98.3 (08-10-23 @ 04:52)  HR: 75 (08-10-23 @ 04:52) (75 - 75)  BP: 134/74 (08-10-23 @ 04:52) (134/74 - 134/74)  BP(mean): --  RR: 17 (08-10-23 @ 04:52) (17 - 17)  SpO2: 96% (08-10-23 @ 04:52) (96% - 96%)  I&O's Summary    09 Aug 2023 07:01  -  10 Aug 2023 07:00  --------------------------------------------------------  IN: 540 mL / OUT: 350 mL / NET: 190 mL        General: Pt Alert and oriented, NAD  B/L LE: sensation intact, DP 2+, brisk cap refill, EHL/FHL/TA/GS 5/5  no shortening or ER                          14.0   7.92  )-----------( 333      ( 09 Aug 2023 12:46 )             41.1     08-09    138  |  98  |  15  ----------------------------<  97  3.0<L>   |  29  |  0.48<L>    Ca    9.3      09 Aug 2023 12:46    TPro  7.0  /  Alb  3.1<L>  /  TBili  0.5  /  DBili  x   /  AST  46<H>  /  ALT  28  /  AlkPhos  132<H>  08-09      A/P: 73yFemale w/ L hip oa and fnf garden I  - Stable  - Pain Control  - DVT ppx: scds  - PT, WBS: bedrest strict  - Dispo: pending OR. Indicated procedure is L GEOFFREY, however, pending patient final decision on if would like L hip CRPP alternatively     Ortho Pager [phone]

## 2023-08-10 NOTE — PROGRESS NOTE ADULT - ASSESSMENT
73 year old woman admitted to the hospital after a fall with left femoral neck fracture     Impression and plan   # Left Femoral Neck Fracture   - Ortho planning for surgery in am     # Pre Operative Risk Eval   -RCRI 0 points (class 1)  -Marroquin score 0.3%  -admission ECG: left axis deviation, anterior lead T wave inversions.  -No hx of chest pain, TALLEY. No hx of SURI.  -METS prior to hip pain >4. Over last several months, activity significantly limited due to hip pain.  -No past allergies to anesthetics.  -Hx of 3 falls since March. PCP thinks 1 fall due to seizure but not concerned for cardiogenic syncope. Had EEG study; pt not sure if seizure actually seen but her PCP (Reshma Lorenzo at [place]) reportedly thought it warranted starting keppra which patient has not yet started.  Recs:  -pt is of low perioperative cardiac risk for low risk procedure  -no further interventions needed prior to procedure    # Given concern for Seizure we should obtain EEG reports and consider Neurology/Epilepsy consult prior to Surgery     # HTN   -Continue Amlodipine     # HLD   - Atorvastatin    # Hypokalemia   - IV replacement     Plan discussed with patient and Orthopedic Team

## 2023-08-10 NOTE — CONSULT NOTE ADULT - SUBJECTIVE AND OBJECTIVE BOX
Neurology consult    MARIA ESTHER [id number]    HPI:  [id number] with PMH of hypothyroidism, depression, hld, htn, pos seizures  presents with L hip pain after a fall, admitted for a femoral neck fracture. She says since May she has had 3 falls in the middle of the night. She denies any LOC but says she doesn't remember the episodes. She says her  heard a "thump" came and found her on the floor. She denies any confusion after the falls. She said the first fall in March, she had some neck and shoulder pain, had imaging done and was told it was negative. She said after the second fall in May, she had urinary and bowel incontinence which is new to her. She saw her neurologist, Dr Fredo Hooper, who did imaging of her brain and a EEG. She says he called her and told her the vEEG was abnormal and that she should start Keppra 750mg BID which she has not yet started. She says the 3rd fall happened on Sunday. She doesn't recall falling and says she woke up in bed with significant L hip pain which is why she came in. Her social history is significant for alcohol use, she used to drink 2 glasses of vodka on the rocks a night, but after the 2nd fall cut down to 1 glass of wine a day. She denies any history of alcohol withdrawal, no nausea, vomiting, irritation, tremor.       Vital Signs Last 24 Hrs  T(C): 36.3 (09 Aug 2023 20:14), Max: 36.9 (09 Aug 2023 13:21)  T(F): 97.4 (09 Aug 2023 20:14), Max: 98.4 (09 Aug 2023 13:21)  HR: 81 (09 Aug 2023 20:14) (81 - 90)  BP: 137/80 (09 Aug 2023 20:14) (131/81 - 149/82)  BP(mean): --  RR: 17 (09 Aug 2023 20:14) (16 - 18)  SpO2: 96% (09 Aug 2023 20:14) (95% - 96%)    Parameters below as of 09 Aug 2023 20:14  Patient On (Oxygen Delivery Method): room air    SOCIAL HISTORY -- No history of tobacco or other drug use. alcohol as above.    Allergies    sulfa drugs (Unknown)  penicillin (Unknown)    Intolerances        Height (cm): 167.6 (08-10 @ 00:48)  Weight (kg): 52.2 (08-10 @ 00:48)  BMI (kg/m2): 18.6 (08-10 @ 00:48)    Vital Signs Last 24 Hrs  T(C): 36.8 (10 Aug 2023 08:40), Max: 36.9 (09 Aug 2023 13:21)  T(F): 98.3 (10 Aug 2023 08:40), Max: 98.4 (09 Aug 2023 13:21)  HR: 83 (10 Aug 2023 08:40) (75 - 85)  BP: 143/80 (10 Aug 2023 08:40) (134/74 - 149/82)  BP(mean): --  RR: 18 (10 Aug 2023 08:40) (16 - 18)  SpO2: 95% (10 Aug 2023 08:40) (95% - 96%)    Parameters below as of 10 Aug 2023 08:40  Patient On (Oxygen Delivery Method): room air      On Neurological Examination:    Mental Status - Patient is alert, awake, oriented X3.     Speech -   Fluent                     CRANIAL NERVES:  II: Pupils equal and reactive, no RAPD, no VF deficits, normal fundus  III, IV, VI: EOM intact, no gaze preference or deviation, no nystagmus.  V: normal sensation in V1, V2, and V3 segments bilaterally  VII: no asymmetry, no nasolabial fold flattening  VIII: normal hearing to speech  IX, X: normal palatal elevation, no uvular deviation  XI: 5/5 head turn and 5/5 shoulder shrug bilaterally  XII: midline tongue protrusion    Motor Exam - 5/5 strength in the upper extremities. 5/5 strength in the proximal and distal RLE. 4/5 strength in the distal LLE 2/2 pain, deferred strength testing in the proximal LLE.    Muscle tone - is normal all over. No asymmetry is seen.      Sensory    Bilateral intact to light touch, vibration, and temperature    Gait - deferred     LABS:  CBC Full  -  ( 10 Aug 2023 10:14 )  WBC Count : 6.84 K/uL  RBC Count : 3.63 M/uL  Hemoglobin : 12.7 g/dL  Hematocrit : 37.0 %  Platelet Count - Automated : 290 K/uL  Mean Cell Volume : 101.9 fl  Mean Cell Hemoglobin : 35.0 pg  Mean Cell Hemoglobin Concentration : 34.3 gm/dL  Auto Neutrophil # : x  Auto Lymphocyte # : x  Auto Monocyte # : x  Auto Eosinophil # : x  Auto Basophil # : x  Auto Neutrophil % : x  Auto Lymphocyte % : x  Auto Monocyte % : x  Auto Eosinophil % : x  Auto Basophil % : x    Urinalysis Basic - ( 10 Aug 2023 10:14 )    Color: x / Appearance: x / SG: x / pH: x  Gluc: 98 mg/dL / Ketone: x  / Bili: x / Urobili: x   Blood: x / Protein: x / Nitrite: x   Leuk Esterase: x / RBC: x / WBC x   Sq Epi: x / Non Sq Epi: x / Bacteria: x      08-10    134<L>  |  96  |  6<L>  ----------------------------<  98  2.7<LL>   |  26  |  0.38<L>    Ca    8.4      10 Aug 2023 10:14    TPro  7.0  /  Alb  3.1<L>  /  TBili  0.5  /  DBili  x   /  AST  46<H>  /  ALT  28  /  AlkPhos  132<H>  08-09    Hemoglobin A1C:     LIVER FUNCTIONS - ( 09 Aug 2023 12:46 )  Alb: 3.1 g/dL / Pro: 7.0 g/dL / ALK PHOS: 132 U/L / ALT: 28 U/L / AST: 46 U/L / GGT: x           Vitamin B12   PT/INR - ( 09 Aug 2023 12:46 )   PT: 10.0 sec;   INR: 0.88          PTT - ( 09 Aug 2023 12:46 )  PTT:31.6 sec      RADIOLOGY    EKG                     Neurology consult    MARIA ESTHER [id number]    HPI:  [id number] with PMH of hypothyroidism, depression, hld, htn, who presents with L hip pain after a fall, admitted for a femoral neck fracture. She says since May she has had 3 falls in the middle of the night. She denies any LOC but says she doesn't remember the episodes. She says her  heard a "thump" came and found her on the floor. She denies any confusion after the falls. She said the first fall in March, she had some neck and shoulder pain, had imaging done and was told it was negative. She said after the second fall in May, she had urinary and bowel incontinence which is new to her. She saw her neurologist, Dr Fredo Hooper, who did imaging of her brain and a EEG. She says he called her and told her the vEEG was abnormal and that she should start Keppra 750mg BID which she has not yet started. She says the 3rd fall happened on Sunday. She doesn't recall falling and says she woke up in bed with significant L hip pain which is why she came in. Her social history is significant for alcohol use, she used to drink 2 glasses of vodka on the rocks a night, but after the 2nd fall cut down to 1 glass of wine a day. She denies any history of alcohol withdrawal, no nausea, vomiting, irritation, tremor.       Vital Signs Last 24 Hrs  T(C): 36.3 (09 Aug 2023 20:14), Max: 36.9 (09 Aug 2023 13:21)  T(F): 97.4 (09 Aug 2023 20:14), Max: 98.4 (09 Aug 2023 13:21)  HR: 81 (09 Aug 2023 20:14) (81 - 90)  BP: 137/80 (09 Aug 2023 20:14) (131/81 - 149/82)  BP(mean): --  RR: 17 (09 Aug 2023 20:14) (16 - 18)  SpO2: 96% (09 Aug 2023 20:14) (95% - 96%)    Parameters below as of 09 Aug 2023 20:14  Patient On (Oxygen Delivery Method): room air    SOCIAL HISTORY -- No history of tobacco or other drug use. alcohol as above.    Allergies    sulfa drugs (Unknown)  penicillin (Unknown)    Intolerances        Height (cm): 167.6 (08-10 @ 00:48)  Weight (kg): 52.2 (08-10 @ 00:48)  BMI (kg/m2): 18.6 (08-10 @ 00:48)    Vital Signs Last 24 Hrs  T(C): 36.8 (10 Aug 2023 08:40), Max: 36.9 (09 Aug 2023 13:21)  T(F): 98.3 (10 Aug 2023 08:40), Max: 98.4 (09 Aug 2023 13:21)  HR: 83 (10 Aug 2023 08:40) (75 - 85)  BP: 143/80 (10 Aug 2023 08:40) (134/74 - 149/82)  BP(mean): --  RR: 18 (10 Aug 2023 08:40) (16 - 18)  SpO2: 95% (10 Aug 2023 08:40) (95% - 96%)    Parameters below as of 10 Aug 2023 08:40  Patient On (Oxygen Delivery Method): room air      On Neurological Examination:    Mental Status - Patient is alert, awake, oriented X3.     Speech -   Fluent                     CRANIAL NERVES:  II: Pupils equal and reactive, no RAPD, no VF deficits, normal fundus  III, IV, VI: EOM intact, no gaze preference or deviation, no nystagmus.  V: normal sensation in V1, V2, and V3 segments bilaterally  VII: no asymmetry, no nasolabial fold flattening  VIII: normal hearing to speech  IX, X: normal palatal elevation, no uvular deviation  XI: 5/5 head turn and 5/5 shoulder shrug bilaterally  XII: midline tongue protrusion    Motor Exam - 5/5 strength in the upper extremities. 5/5 strength in the proximal and distal RLE. 4/5 strength in the distal LLE 2/2 pain, deferred strength testing in the proximal LLE.    Muscle tone - is normal all over. No asymmetry is seen.      Sensory    Bilateral intact to light touch, vibration, and temperature    Gait - deferred     LABS:  CBC Full  -  ( 10 Aug 2023 10:14 )  WBC Count : 6.84 K/uL  RBC Count : 3.63 M/uL  Hemoglobin : 12.7 g/dL  Hematocrit : 37.0 %  Platelet Count - Automated : 290 K/uL  Mean Cell Volume : 101.9 fl  Mean Cell Hemoglobin : 35.0 pg  Mean Cell Hemoglobin Concentration : 34.3 gm/dL  Auto Neutrophil # : x  Auto Lymphocyte # : x  Auto Monocyte # : x  Auto Eosinophil # : x  Auto Basophil # : x  Auto Neutrophil % : x  Auto Lymphocyte % : x  Auto Monocyte % : x  Auto Eosinophil % : x  Auto Basophil % : x    Urinalysis Basic - ( 10 Aug 2023 10:14 )    Color: x / Appearance: x / SG: x / pH: x  Gluc: 98 mg/dL / Ketone: x  / Bili: x / Urobili: x   Blood: x / Protein: x / Nitrite: x   Leuk Esterase: x / RBC: x / WBC x   Sq Epi: x / Non Sq Epi: x / Bacteria: x      08-10    134<L>  |  96  |  6<L>  ----------------------------<  98  2.7<LL>   |  26  |  0.38<L>    Ca    8.4      10 Aug 2023 10:14    TPro  7.0  /  Alb  3.1<L>  /  TBili  0.5  /  DBili  x   /  AST  46<H>  /  ALT  28  /  AlkPhos  132<H>  08-09    Hemoglobin A1C:     LIVER FUNCTIONS - ( 09 Aug 2023 12:46 )  Alb: 3.1 g/dL / Pro: 7.0 g/dL / ALK PHOS: 132 U/L / ALT: 28 U/L / AST: 46 U/L / GGT: x           Vitamin B12   PT/INR - ( 09 Aug 2023 12:46 )   PT: 10.0 sec;   INR: 0.88          PTT - ( 09 Aug 2023 12:46 )  PTT:31.6 sec      RADIOLOGY    EKG                     Neurology consult    MARIA ESTHER [id number]    HPI:  [id number] with PMH of hypothyroidism, depression, hld, htn, who presents with L hip pain after a fall, admitted for a femoral neck fracture. She says since May she has had 3 falls in the middle of the night. She denies any LOC but says she doesn't remember the episodes. She says her  heard a "thump" came and found her on the floor. She denies any confusion after the falls. She said the first fall in March, she had some neck and shoulder pain, had imaging done and was told it was negative. She said after the second fall in May, she had urinary and bowel incontinence which is new to her. She saw her neurologist, Dr Fredo Hooper, who did imaging of her brain and a EEG. She says he called her and told her the vEEG was abnormal and that she should start Keppra 750mg BID which she has not yet started. She says the 3rd fall happened on Sunday. She doesn't recall falling and says she woke up in bed with significant L hip pain which is why she came in. Her social history is significant for alcohol use, she used to drink 2 glasses of vodka on the rocks a night, but after the 2nd fall cut down to 1 glass of wine a day. She denies any history of alcohol withdrawal, no nausea, vomiting, irritation, tremor.           Vital Signs Last 24 Hrs  T(C): 36.3 (09 Aug 2023 20:14), Max: 36.9 (09 Aug 2023 13:21)  T(F): 97.4 (09 Aug 2023 20:14), Max: 98.4 (09 Aug 2023 13:21)  HR: 81 (09 Aug 2023 20:14) (81 - 90)  BP: 137/80 (09 Aug 2023 20:14) (131/81 - 149/82)  BP(mean): --  RR: 17 (09 Aug 2023 20:14) (16 - 18)  SpO2: 96% (09 Aug 2023 20:14) (95% - 96%)    Parameters below as of 09 Aug 2023 20:14  Patient On (Oxygen Delivery Method): room air    SOCIAL HISTORY -- No history of tobacco or other drug use. alcohol as above.    Allergies    sulfa drugs (Unknown)  penicillin (Unknown)    Intolerances        Height (cm): 167.6 (08-10 @ 00:48)  Weight (kg): 52.2 (08-10 @ 00:48)  BMI (kg/m2): 18.6 (08-10 @ 00:48)    Vital Signs Last 24 Hrs  T(C): 36.8 (10 Aug 2023 08:40), Max: 36.9 (09 Aug 2023 13:21)  T(F): 98.3 (10 Aug 2023 08:40), Max: 98.4 (09 Aug 2023 13:21)  HR: 83 (10 Aug 2023 08:40) (75 - 85)  BP: 143/80 (10 Aug 2023 08:40) (134/74 - 149/82)  BP(mean): --  RR: 18 (10 Aug 2023 08:40) (16 - 18)  SpO2: 95% (10 Aug 2023 08:40) (95% - 96%)    Parameters below as of 10 Aug 2023 08:40  Patient On (Oxygen Delivery Method): room air      On Neurological Examination:    Mental Status - Patient is alert, awake, oriented X3.     Speech -   Fluent                     CRANIAL NERVES:  II: Pupils equal and reactive, no RAPD, no VF deficits, normal fundus  III, IV, VI: EOM intact, no gaze preference or deviation, no nystagmus.  V: normal sensation in V1, V2, and V3 segments bilaterally  VII: no asymmetry, no nasolabial fold flattening  VIII: normal hearing to speech  IX, X: normal palatal elevation, no uvular deviation  XI: 5/5 head turn and 5/5 shoulder shrug bilaterally  XII: midline tongue protrusion    Motor Exam - 5/5 strength in the upper extremities. 5/5 strength in the proximal and distal RLE. 4/5 strength in the distal LLE 2/2 pain, deferred strength testing in the proximal LLE.    Muscle tone - is normal all over. No asymmetry is seen.      Sensory    Bilateral intact to light touch, vibration, and temperature    Gait - deferred     LABS:  CBC Full  -  ( 10 Aug 2023 10:14 )  WBC Count : 6.84 K/uL  RBC Count : 3.63 M/uL  Hemoglobin : 12.7 g/dL  Hematocrit : 37.0 %  Platelet Count - Automated : 290 K/uL  Mean Cell Volume : 101.9 fl  Mean Cell Hemoglobin : 35.0 pg  Mean Cell Hemoglobin Concentration : 34.3 gm/dL  Auto Neutrophil # : x  Auto Lymphocyte # : x  Auto Monocyte # : x  Auto Eosinophil # : x  Auto Basophil # : x  Auto Neutrophil % : x  Auto Lymphocyte % : x  Auto Monocyte % : x  Auto Eosinophil % : x  Auto Basophil % : x    Urinalysis Basic - ( 10 Aug 2023 10:14 )    Color: x / Appearance: x / SG: x / pH: x  Gluc: 98 mg/dL / Ketone: x  / Bili: x / Urobili: x   Blood: x / Protein: x / Nitrite: x   Leuk Esterase: x / RBC: x / WBC x   Sq Epi: x / Non Sq Epi: x / Bacteria: x      08-10    134<L>  |  96  |  6<L>  ----------------------------<  98  2.7<LL>   |  26  |  0.38<L>    Ca    8.4      10 Aug 2023 10:14    TPro  7.0  /  Alb  3.1<L>  /  TBili  0.5  /  DBili  x   /  AST  46<H>  /  ALT  28  /  AlkPhos  132<H>  08-09    Hemoglobin A1C:     LIVER FUNCTIONS - ( 09 Aug 2023 12:46 )  Alb: 3.1 g/dL / Pro: 7.0 g/dL / ALK PHOS: 132 U/L / ALT: 28 U/L / AST: 46 U/L / GGT: x           Vitamin B12   PT/INR - ( 09 Aug 2023 12:46 )   PT: 10.0 sec;   INR: 0.88          PTT - ( 09 Aug 2023 12:46 )  PTT:31.6 sec      RADIOLOGY    EKG

## 2023-08-10 NOTE — PROGRESS NOTE ADULT - SUBJECTIVE AND OBJECTIVE BOX
Patient is a 73y old  Female who presents with a chief complaint of Left hip pain       SUBJECTIVE:  Patient was seen and examined at bedside.    Overnight Events : patient reports being unable to sleep last night , this morning pain is well controlled, does not have any other complaints       Review of systems: 12 point Review of systems negative unless otherwise documented elsewhere in note.     Diet, NPO after Midnight:      NPO Start Date: 09-Aug-2023,   NPO Start Time: 23:59  Except Medications (08-09-23 @ 21:31) [Active]  Diet, Regular (08-09-23 @ 21:31) [Active]      MEDICATIONS:  MEDICATIONS  (STANDING):  amLODIPine   Tablet 5 milliGRAM(s) Oral daily  atorvastatin 20 milliGRAM(s) Oral at bedtime  chlorhexidine 2% Cloths 1 Application(s) Topical once  gabapentin 300 milliGRAM(s) Oral daily  levETIRAcetam 250 milliGRAM(s) Oral two times a day  levothyroxine 50 MICROGram(s) Oral daily  multivitamin 1 Tablet(s) Oral daily  pantoprazole    Tablet 40 milliGRAM(s) Oral before breakfast  polyethylene glycol 3350 17 Gram(s) Oral daily  povidone iodine 5% Nasal Swab 1 Application(s) Both Nostrils once  senna 2 Tablet(s) Oral at bedtime  sertraline 25 milliGRAM(s) Oral daily  sodium chloride 0.9%. 1000 milliLiter(s) (90 mL/Hr) IV Continuous <Continuous>    MEDICATIONS  (PRN):  HYDROmorphone  Injectable 0.5 milliGRAM(s) IV Push every 6 hours PRN Breakthrough Pain  oxyCODONE    IR 10 milliGRAM(s) Oral every 4 hours PRN Moderate Pain (4 - 6)  oxyCODONE    IR 5 milliGRAM(s) Oral every 4 hours PRN Mild Pain (1 - 3)  zaleplon 5 milliGRAM(s) Oral at bedtime PRN Insomnia  zaleplon 5 milliGRAM(s) Oral at bedtime PRN Insomnia      Allergies    sulfa drugs (Unknown)  penicillin (Unknown)    Intolerances        OBJECTIVE:  Vital Signs Last 24 Hrs  T(C): 36.8 (10 Aug 2023 08:40), Max: 36.9 (09 Aug 2023 13:21)  T(F): 98.3 (10 Aug 2023 08:40), Max: 98.4 (09 Aug 2023 13:21)  HR: 83 (10 Aug 2023 08:40) (75 - 85)  BP: 143/80 (10 Aug 2023 08:40) (134/74 - 149/82)  BP(mean): --  RR: 18 (10 Aug 2023 08:40) (16 - 18)  SpO2: 95% (10 Aug 2023 08:40) (95% - 96%)    Parameters below as of 10 Aug 2023 08:40  Patient On (Oxygen Delivery Method): room air      I&O's Summary    09 Aug 2023 07:01  -  10 Aug 2023 07:00  --------------------------------------------------------  IN: 540 mL / OUT: 350 mL / NET: 190 mL    10 Aug 2023 07:01  -  10 Aug 2023 11:32  --------------------------------------------------------  IN: 450 mL / OUT: 425 mL / NET: 25 mL        PHYSICAL EXAM:  Gen: Resting in bed at time of exam, not in distress   HEENT: moist mucosa, no lesions   Neck: supple, trachea at midline  CV: RRR, +S1/S2  Pulm: no wheezing , no crackles  no increase in work of breathing  Abd: soft, NTND  Skin: warm and dry, no new rashes   Ext: Left leg Ext Rotation   Neuro: AOx3, no gross focal neurological deficits  Psych: affect and behavior appropriate, pleasant at time of interview    LABS:                        12.7   6.84  )-----------( 290      ( 10 Aug 2023 10:14 )             37.0     08-10    134<L>  |  96  |  6<L>  ----------------------------<  98  2.7<LL>   |  26  |  0.38<L>    Ca    8.4      10 Aug 2023 10:14    TPro  7.0  /  Alb  3.1<L>  /  TBili  0.5  /  DBili  x   /  AST  46<H>  /  ALT  28  /  AlkPhos  132<H>  08-09    LIVER FUNCTIONS - ( 09 Aug 2023 12:46 )  Alb: 3.1 g/dL / Pro: 7.0 g/dL / ALK PHOS: 132 U/L / ALT: 28 U/L / AST: 46 U/L / GGT: x           PT/INR - ( 09 Aug 2023 12:46 )   PT: 10.0 sec;   INR: 0.88          PTT - ( 09 Aug 2023 12:46 )  PTT:31.6 sec  CAPILLARY BLOOD GLUCOSE        Urinalysis Basic - ( 10 Aug 2023 10:14 )    Color: x / Appearance: x / SG: x / pH: x  Gluc: 98 mg/dL / Ketone: x  / Bili: x / Urobili: x   Blood: x / Protein: x / Nitrite: x   Leuk Esterase: x / RBC: x / WBC x   Sq Epi: x / Non Sq Epi: x / Bacteria: x        MICRODATA:      RADIOLOGY/OTHER STUDIES:

## 2023-08-10 NOTE — PRE-ANESTHESIA EVALUATION ADULT - NSANTHPMHFT_GEN_ALL_CORE
Seizure disorder?    73yFemale with PMH of hypothyroidism, depression, hld, htn, pos seizures  presents with L hip pain after mechanical fall. Was immediately unable to bear weight on that limb. In ED, x-rays showed Garden I femoral neck fracture.

## 2023-08-10 NOTE — PROGRESS NOTE ADULT - SUBJECTIVE AND OBJECTIVE BOX
Preop for left GEOFFREY in setting of left garden I FNF with OA and AVN   SUBJECTIVE: Patient seen and examined.  Pt without complaints. Notes she did not sleep last night.   Pt unable to recall events of her fall that led to hip fracture; also unable to recall prior 2 falls before that over last 3 months. States she was seen last week in Dr. Yifan Hooper's office at [place] where she underwent EEG and told her findings were consistent with seizure activity. THey prescribed her Keppra that is still at the pharmacy waiting for her to pick it up, as she did not expect to have a broken hip and require admission.   Pt understands injury and recommendation for surgery. Pt requesting Dr Ha. Amenable to surgery tomorrow.   Denies chest pain/SOB/dizziness/n/v/HA   Pain well controlled.       OBJECTIVE:     Vital Signs Last 24 Hrs  T(C): 36.8 (10 Aug 2023 08:40), Max: 36.9 (09 Aug 2023 13:21)  T(F): 98.3 (10 Aug 2023 08:40), Max: 98.4 (09 Aug 2023 13:21)  HR: 83 (10 Aug 2023 08:40) (75 - 85)  BP: 143/80 (10 Aug 2023 08:40) (134/74 - 149/82)  BP(mean): --  RR: 18 (10 Aug 2023 08:40) (16 - 18)  SpO2: 95% (10 Aug 2023 08:40) (95% - 96%)    Parameters below as of 10 Aug 2023 08:40  Patient On (Oxygen Delivery Method): room air        PE:  Resting comfortably in bed, pleasant, alert and oriented x 3        No visible deformity to BLE. +TTP left hip          Sensation: intact to light touch to patient's baseline BLE          Motor exam:  firing ehl/ta/gs/fhl 5/5 BLE          Skin warm, well-perfused; capillary refill brisk BLE dp palpable BLE              LABS:                        12.7   6.84  )-----------( 290      ( 10 Aug 2023 10:14 )             37.0     08-10    134<L>  |  96  |  6<L>  ----------------------------<  98  2.7<LL>   |  26  |  0.38<L>    Ca    8.4      10 Aug 2023 10:14    TPro  7.0  /  Alb  3.1<L>  /  TBili  0.5  /  DBili  x   /  AST  46<H>  /  ALT  28  /  AlkPhos  132<H>  08-09    PT/INR - ( 09 Aug 2023 12:46 )   PT: 10.0 sec;   INR: 0.88          PTT - ( 09 Aug 2023 12:46 )  PTT:31.6 sec  Urinalysis Basic - ( 10 Aug 2023 10:14 )  Color: x / Appearance: x / SG: x / pH: x  Gluc: 98 mg/dL / Ketone: x  / Bili: x / Urobili: x   Blood: x / Protein: x / Nitrite: x   Leuk Esterase: x / RBC: x / WBC x   Sq Epi: x / Non Sq Epi: x / Bacteria: x      ASSESSMENT AND PLAN: 73F with left hip fracture for left GEOFFREY tomorrow with Dr. Ha; recently dx with seizure d/o about to start Keppra as she was admitted to hospital   1. K 2.7 noted, repleted this afternoon.  - Collateral requested from outside Neurologist office and [place] PCP Dr. Alley Lorenzo; awaiting faxes  - Neurology/epilepsy consult placed- plan to give loading dose of Keppra 1500 now, and 750 BID to start tonight; Should also get in AM prior to surgery per Neurology. Appreciate recs.   - Med Co-Mgt recs appreciated. Obtaining PCP collateral. Will follow up recs  2. Analgesic pain control  3. DVT prophylaxis: SCDs, hold AC for OR   4. Weight Bearing Status:  NWB LLE   5. Disposition:  Pending OR tomorrow with Dr. Ha

## 2023-08-10 NOTE — PRE-ANESTHESIA EVALUATION ADULT - NSANTHOSAYNRD_GEN_A_CORE
No. SURI screening performed.  STOP BANG Legend: 0-2 = LOW Risk; 3-4 = INTERMEDIATE Risk; 5-8 = HIGH Risk

## 2023-08-10 NOTE — PATIENT PROFILE ADULT - FALL HARM RISK - HARM RISK INTERVENTIONS
Assistance with ambulation/Assistance OOB with selected safe patient handling equipment/Communicate Risk of Fall with Harm to all staff/Discuss with provider need for PT consult/Monitor gait and stability/Reinforce activity limits and safety measures with patient and family/Tailored Fall Risk Interventions/Visual Cue: Yellow wristband and red socks/Bed in lowest position, wheels locked, appropriate side rails in place/Call bell, personal items and telephone in reach/Instruct patient to call for assistance before getting out of bed or chair/Non-slip footwear when patient is out of bed/[place] to call system/Physically safe environment - no spills, clutter or unnecessary equipment/Purposeful Proactive Rounding/Room/bathroom lighting operational, light cord in reach

## 2023-08-10 NOTE — CONSULT NOTE ADULT - ASSESSMENT
73yFemale with PMH of hypothyroidism, depression, hld, htn, presents with L hip pain after a fall, admitted for a femoral neck fracture. She says since May she has had 3 falls in the middle of the night. She denies any LOC but says she doesn't remember the episodes. She says her  heard a "thump" came and found her on the floor. She denies any confusion after the falls. She said the first fall in March, she had some neck and shoulder pain, had imaging done and was told it was negative. She said after the second fall in May, she had urinary and bowel incontinence which is new to her. She saw her neurologist, Dr Fredo Hooper, who did imaging of her brain and a EEG. She says he called her and told her the vEEG was abnormal and that she should start Keppra 750mg BID which she has not yet started. She says the 3rd fall happened on Sunday. She doesn't recall falling and says she woke up in bed with significant L hip pain which is why she came in. Her social history is significant for alcohol use, she used to drink 2 glasses of vodka on the rocks a night, but after the 2nd fall cut down to 1 glass of wine a day. She denies any history of alcohol withdrawal, no nausea, vomiting, irritation, tremor.     Impression:  Collateral from outpatient neurologist is pending, however history is concerning for seizure diagnosis. As anesthesia may increase the risk of having a seizure, would favor starting treatment preemptively.    Recommendations:  Load Keppra 1500mg once now  Start Keppra 750mg BID starting tonight (should get a dose tonight and tomorrow AM prior to surgery)  Obtain collateral from outpatient neurologist  Utox and routine labs including TSH, Mg and Phos.  Seizure & Fall precautions  Ativan 2mg IVP for seizures > 2mins  Keep Mg>2 and K >4.   Management per primary team.     Please call us if any questions or neurological changes.    72yo female with PMH of hypothyroidism, depression, HLD, HTN, presents with L hip pain after a fall, admitted for a femoral neck fracture. She says since May she has had 3 falls in the middle of the night. She denies any LOC but says she doesn't remember the episodes. She says her  heard a "thump" and found her on the floor. She denies any confusion after the falls. She said the first fall in March, she had some neck and shoulder pain, had imaging done and was told it was negative. She said after the second fall in May, she had urinary and bowel incontinence which is new to her. She saw her neurologist, Dr Fredo Hooper, who did imaging of her brain and a EEG. She says he called her and told her the vEEG was abnormal and that she should start Keppra 750mg BID which she has not yet started. She says the 3rd fall happened on Sunday leading to left femoral neck fracture and current admission. She doesn't recall falling and says she woke up in bed with significant L hip pain which is why she came in. Her social history is significant for alcohol use, she used to drink 2 glasses of vodka on the rocks a night, but after the 2nd fall cut down to 1 glass of wine a day. She denies any history of alcohol withdrawal, no nausea, vomiting, irritation, tremor.     Impression:  Collateral from outpatient neurologist is pending, however history is concerning for seizure diagnosis. As anesthesia may increase the risk of having a seizure, would favor starting treatment preemptively.    Recommendations:  Load Keppra 1500mg once now  Start Keppra 750mg BID starting tonight (should get a dose tonight and tomorrow AM prior to surgery)  Obtain collateral from outpatient neurologist  Utox and routine labs including TSH, Mg and Phos.  Seizure & Fall precautions  Ativan 2mg IVP for seizures > 2mins  Keep Mg>2 and K >4.   Management per primary team.     Please call us if any questions or neurological changes.     Addendum:  Collateral per outpatient Neurologist (Dr Yifan Boone)  -vEEG (8/01/2023): Recorded in sleep & awake states. "Temporal sharp waves with phase reversals at T3 and T4, left temporal rhythmic theta and a single 3-second transient occured; left fronto-temporal 4 Hz slowing occured. Interpretation: This is an abnormal routing EEG recorded in the awake state. This result is consistent with, but not diagnostic of a generalized seizure disorder. -Yifan Boone MD, Neurologist"  -MR Brain (7/13/2023): "There is mild global cerebral volume loss without a lobar predilection or hydrocephalus. There is mild, patchy periventricular T2/FLAIR hyperintensity in addition to scattered small foci within the subcortical and to a lesser extent deep hemispheric white matter. May reflect mild microvascular ischemia and the sequela of migraines. No acute or early subacute infarct. -Alberto Oneil MD, Radiologist - [facility]"  -MR Angio Brain (7/20/2023): "No stenosis of bilateral ICAs; normal appearance of ECAs. No evidence of dissection. No beading to suggest vasculitis. No extra-axial fluid collection or acute intracranial hemorrhage. No suspicious foci of parenchymal susceptibility identified. No suspicious mass lesion, mass effect or vasogenic edema. Mild scattered paransasal sinus mucosal thickening w/ areas appearing polypoid in nature, notabl within the right maxillary alveolar recess. Partially imaged cervical spondylosis is noted. - Merrill Prince MD, Radiologist -  [facility]"

## 2023-08-11 LAB
24R-OH-CALCIDIOL SERPL-MCNC: 73.5 NG/ML — SIGNIFICANT CHANGE UP (ref 30–80)
ANION GAP SERPL CALC-SCNC: 12 MMOL/L — SIGNIFICANT CHANGE UP (ref 5–17)
ANION GAP SERPL CALC-SCNC: 15 MMOL/L — SIGNIFICANT CHANGE UP (ref 5–17)
BUN SERPL-MCNC: 5 MG/DL — LOW (ref 7–23)
BUN SERPL-MCNC: 6 MG/DL — LOW (ref 7–23)
CALCIUM SERPL-MCNC: 8.7 MG/DL — SIGNIFICANT CHANGE UP (ref 8.4–10.5)
CALCIUM SERPL-MCNC: 9.6 MG/DL — SIGNIFICANT CHANGE UP (ref 8.4–10.5)
CHLORIDE SERPL-SCNC: 99 MMOL/L — SIGNIFICANT CHANGE UP (ref 96–108)
CO2 SERPL-SCNC: 24 MMOL/L — SIGNIFICANT CHANGE UP (ref 22–31)
CREAT SERPL-MCNC: 0.43 MG/DL — LOW (ref 0.5–1.3)
CREAT SERPL-MCNC: 0.44 MG/DL — LOW (ref 0.5–1.3)
EGFR: 103 ML/MIN/1.73M2 — SIGNIFICANT CHANGE UP
EGFR: 104 ML/MIN/1.73M2 — SIGNIFICANT CHANGE UP
GLUCOSE SERPL-MCNC: 96 MG/DL — SIGNIFICANT CHANGE UP (ref 70–99)
HCT VFR BLD CALC: 37.3 % — SIGNIFICANT CHANGE UP (ref 34.5–45)
HGB BLD-MCNC: 12.5 G/DL — SIGNIFICANT CHANGE UP (ref 11.5–15.5)
MCHC RBC-ENTMCNC: 33.5 GM/DL — SIGNIFICANT CHANGE UP (ref 32–36)
MCHC RBC-ENTMCNC: 34.7 PG — HIGH (ref 27–34)
MCV RBC AUTO: 103.6 FL — HIGH (ref 80–100)
NRBC # BLD: 0 /100 WBCS — SIGNIFICANT CHANGE UP (ref 0–0)
PLATELET # BLD AUTO: 303 K/UL — SIGNIFICANT CHANGE UP (ref 150–400)
POTASSIUM SERPL-MCNC: 3.1 MMOL/L — LOW (ref 3.5–5.3)
POTASSIUM SERPL-MCNC: 3.4 MMOL/L — LOW (ref 3.5–5.3)
POTASSIUM SERPL-SCNC: 3.1 MMOL/L — LOW (ref 3.5–5.3)
POTASSIUM SERPL-SCNC: 3.4 MMOL/L — LOW (ref 3.5–5.3)
PTH-INTACT FLD-MCNC: 70 PG/ML — HIGH (ref 15–65)
RBC # BLD: 3.6 M/UL — LOW (ref 3.8–5.2)
RBC # FLD: 12.1 % — SIGNIFICANT CHANGE UP (ref 10.3–14.5)
SODIUM SERPL-SCNC: 134 MMOL/L — LOW (ref 135–145)
SODIUM SERPL-SCNC: 138 MMOL/L — SIGNIFICANT CHANGE UP (ref 135–145)
WBC # BLD: 5.92 K/UL — SIGNIFICANT CHANGE UP (ref 3.8–10.5)
WBC # FLD AUTO: 5.92 K/UL — SIGNIFICANT CHANGE UP (ref 3.8–10.5)

## 2023-08-11 PROCEDURE — 93970 EXTREMITY STUDY: CPT | Mod: 26

## 2023-08-11 PROCEDURE — 88304 TISSUE EXAM BY PATHOLOGIST: CPT | Mod: 26

## 2023-08-11 PROCEDURE — 72170 X-RAY EXAM OF PELVIS: CPT | Mod: 26

## 2023-08-11 DEVICE — RESTRIC CEM BUCK 18.5MM: Type: IMPLANTABLE DEVICE | Site: LEFT | Status: FUNCTIONAL

## 2023-08-11 DEVICE — SHELL ACET G7 OSSEOTI C HEMISPHR 3 HOLE 48MM: Type: IMPLANTABLE DEVICE | Site: LEFT | Status: FUNCTIONAL

## 2023-08-11 DEVICE — ZIMMER FEMORAL BONE CEMENT KIT: Type: IMPLANTABLE DEVICE | Site: LEFT | Status: FUNCTIONAL

## 2023-08-11 DEVICE — IMPLANTABLE DEVICE: Type: IMPLANTABLE DEVICE | Site: LEFT | Status: FUNCTIONAL

## 2023-08-11 DEVICE — SCREW SELF TAP 6.5X25MM: Type: IMPLANTABLE DEVICE | Site: LEFT | Status: FUNCTIONAL

## 2023-08-11 DEVICE — CEMENT SIMPLEX WITH TOBRAMYCIN: Type: IMPLANTABLE DEVICE | Site: LEFT | Status: FUNCTIONAL

## 2023-08-11 DEVICE — BIOLOX CERAMIC FEMORAL HEAD: Type: IMPLANTABLE DEVICE | Site: LEFT | Status: FUNCTIONAL

## 2023-08-11 DEVICE — IMPLNT HIP HD FEM 32MM 3.5MM CERAMIC BIOLOX DELTA: Type: IMPLANTABLE DEVICE | Site: LEFT | Status: FUNCTIONAL

## 2023-08-11 RX ORDER — CEFAZOLIN SODIUM 1 G
2000 VIAL (EA) INJECTION EVERY 8 HOURS
Refills: 0 | Status: COMPLETED | OUTPATIENT
Start: 2023-08-11 | End: 2023-08-12

## 2023-08-11 RX ORDER — HYDROMORPHONE HYDROCHLORIDE 2 MG/ML
0.5 INJECTION INTRAMUSCULAR; INTRAVENOUS; SUBCUTANEOUS ONCE
Refills: 0 | Status: DISCONTINUED | OUTPATIENT
Start: 2023-08-11 | End: 2023-08-11

## 2023-08-11 RX ORDER — GABAPENTIN 400 MG/1
300 CAPSULE ORAL DAILY
Refills: 0 | Status: DISCONTINUED | OUTPATIENT
Start: 2023-08-11 | End: 2023-08-12

## 2023-08-11 RX ORDER — POTASSIUM CHLORIDE 20 MEQ
10 PACKET (EA) ORAL
Refills: 0 | Status: DISCONTINUED | OUTPATIENT
Start: 2023-08-11 | End: 2023-08-11

## 2023-08-11 RX ORDER — POTASSIUM CHLORIDE 20 MEQ
20 PACKET (EA) ORAL ONCE
Refills: 0 | Status: COMPLETED | OUTPATIENT
Start: 2023-08-11 | End: 2023-08-11

## 2023-08-11 RX ORDER — OXYCODONE HYDROCHLORIDE 5 MG/1
5 TABLET ORAL EVERY 4 HOURS
Refills: 0 | Status: DISCONTINUED | OUTPATIENT
Start: 2023-08-11 | End: 2023-08-12

## 2023-08-11 RX ORDER — ZALEPLON 10 MG
5 CAPSULE ORAL AT BEDTIME
Refills: 0 | Status: DISCONTINUED | OUTPATIENT
Start: 2023-08-11 | End: 2023-08-12

## 2023-08-11 RX ORDER — ACETAMINOPHEN 500 MG
1000 TABLET ORAL ONCE
Refills: 0 | Status: COMPLETED | OUTPATIENT
Start: 2023-08-11 | End: 2023-08-11

## 2023-08-11 RX ORDER — SENNA PLUS 8.6 MG/1
2 TABLET ORAL AT BEDTIME
Refills: 0 | Status: DISCONTINUED | OUTPATIENT
Start: 2023-08-11 | End: 2023-08-12

## 2023-08-11 RX ORDER — POTASSIUM CHLORIDE 20 MEQ
40 PACKET (EA) ORAL EVERY 4 HOURS
Refills: 0 | Status: DISCONTINUED | OUTPATIENT
Start: 2023-08-11 | End: 2023-08-11

## 2023-08-11 RX ORDER — HYDROMORPHONE HYDROCHLORIDE 2 MG/ML
0.5 INJECTION INTRAMUSCULAR; INTRAVENOUS; SUBCUTANEOUS EVERY 6 HOURS
Refills: 0 | Status: DISCONTINUED | OUTPATIENT
Start: 2023-08-11 | End: 2023-08-12

## 2023-08-11 RX ORDER — SODIUM CHLORIDE 9 MG/ML
1000 INJECTION, SOLUTION INTRAVENOUS
Refills: 0 | Status: DISCONTINUED | OUTPATIENT
Start: 2023-08-11 | End: 2023-08-12

## 2023-08-11 RX ORDER — PANTOPRAZOLE SODIUM 20 MG/1
40 TABLET, DELAYED RELEASE ORAL
Refills: 0 | Status: DISCONTINUED | OUTPATIENT
Start: 2023-08-11 | End: 2023-08-12

## 2023-08-11 RX ORDER — LEVETIRACETAM 250 MG/1
750 TABLET, FILM COATED ORAL
Refills: 0 | Status: DISCONTINUED | OUTPATIENT
Start: 2023-08-11 | End: 2023-08-12

## 2023-08-11 RX ORDER — HYDROMORPHONE HYDROCHLORIDE 2 MG/ML
0.5 INJECTION INTRAMUSCULAR; INTRAVENOUS; SUBCUTANEOUS
Refills: 0 | Status: DISCONTINUED | OUTPATIENT
Start: 2023-08-11 | End: 2023-08-12

## 2023-08-11 RX ORDER — ENOXAPARIN SODIUM 100 MG/ML
40 INJECTION SUBCUTANEOUS EVERY 24 HOURS
Refills: 0 | Status: DISCONTINUED | OUTPATIENT
Start: 2023-08-11 | End: 2023-08-12

## 2023-08-11 RX ORDER — AMLODIPINE BESYLATE 2.5 MG/1
5 TABLET ORAL DAILY
Refills: 0 | Status: DISCONTINUED | OUTPATIENT
Start: 2023-08-11 | End: 2023-08-12

## 2023-08-11 RX ORDER — LEVOTHYROXINE SODIUM 125 MCG
50 TABLET ORAL DAILY
Refills: 0 | Status: DISCONTINUED | OUTPATIENT
Start: 2023-08-11 | End: 2023-08-12

## 2023-08-11 RX ORDER — SERTRALINE 25 MG/1
25 TABLET, FILM COATED ORAL DAILY
Refills: 0 | Status: DISCONTINUED | OUTPATIENT
Start: 2023-08-11 | End: 2023-08-12

## 2023-08-11 RX ORDER — POLYETHYLENE GLYCOL 3350 17 G/17G
17 POWDER, FOR SOLUTION ORAL DAILY
Refills: 0 | Status: DISCONTINUED | OUTPATIENT
Start: 2023-08-11 | End: 2023-08-12

## 2023-08-11 RX ORDER — ATORVASTATIN CALCIUM 80 MG/1
20 TABLET, FILM COATED ORAL AT BEDTIME
Refills: 0 | Status: DISCONTINUED | OUTPATIENT
Start: 2023-08-11 | End: 2023-08-12

## 2023-08-11 RX ORDER — OXYCODONE HYDROCHLORIDE 5 MG/1
10 TABLET ORAL EVERY 4 HOURS
Refills: 0 | Status: DISCONTINUED | OUTPATIENT
Start: 2023-08-11 | End: 2023-08-12

## 2023-08-11 RX ADMIN — OXYCODONE HYDROCHLORIDE 5 MILLIGRAM(S): 5 TABLET ORAL at 17:40

## 2023-08-11 RX ADMIN — OXYCODONE HYDROCHLORIDE 5 MILLIGRAM(S): 5 TABLET ORAL at 17:55

## 2023-08-11 RX ADMIN — LEVETIRACETAM 750 MILLIGRAM(S): 250 TABLET, FILM COATED ORAL at 09:21

## 2023-08-11 RX ADMIN — HYDROMORPHONE HYDROCHLORIDE 0.5 MILLIGRAM(S): 2 INJECTION INTRAMUSCULAR; INTRAVENOUS; SUBCUTANEOUS at 16:50

## 2023-08-11 RX ADMIN — SODIUM CHLORIDE 70 MILLILITER(S): 9 INJECTION, SOLUTION INTRAVENOUS at 17:10

## 2023-08-11 RX ADMIN — Medication 1000 MILLIGRAM(S): at 17:05

## 2023-08-11 RX ADMIN — SODIUM CHLORIDE 90 MILLILITER(S): 9 INJECTION INTRAMUSCULAR; INTRAVENOUS; SUBCUTANEOUS at 05:03

## 2023-08-11 RX ADMIN — Medication 1 TABLET(S): at 18:59

## 2023-08-11 RX ADMIN — HYDROMORPHONE HYDROCHLORIDE 0.5 MILLIGRAM(S): 2 INJECTION INTRAMUSCULAR; INTRAVENOUS; SUBCUTANEOUS at 17:20

## 2023-08-11 RX ADMIN — HYDROMORPHONE HYDROCHLORIDE 0.5 MILLIGRAM(S): 2 INJECTION INTRAMUSCULAR; INTRAVENOUS; SUBCUTANEOUS at 17:05

## 2023-08-11 RX ADMIN — Medication 20 MILLIEQUIVALENT(S): at 06:48

## 2023-08-11 RX ADMIN — SENNA PLUS 2 TABLET(S): 8.6 TABLET ORAL at 21:39

## 2023-08-11 RX ADMIN — Medication 50 MICROGRAM(S): at 05:03

## 2023-08-11 RX ADMIN — Medication 40 MILLIEQUIVALENT(S): at 10:29

## 2023-08-11 RX ADMIN — LEVETIRACETAM 750 MILLIGRAM(S): 250 TABLET, FILM COATED ORAL at 18:59

## 2023-08-11 RX ADMIN — HYDROMORPHONE HYDROCHLORIDE 0.5 MILLIGRAM(S): 2 INJECTION INTRAMUSCULAR; INTRAVENOUS; SUBCUTANEOUS at 17:40

## 2023-08-11 RX ADMIN — Medication 400 MILLIGRAM(S): at 16:50

## 2023-08-11 RX ADMIN — Medication 100 MILLIGRAM(S): at 21:38

## 2023-08-11 RX ADMIN — Medication 5 MILLIGRAM(S): at 22:34

## 2023-08-11 RX ADMIN — ENOXAPARIN SODIUM 40 MILLIGRAM(S): 100 INJECTION SUBCUTANEOUS at 19:45

## 2023-08-11 RX ADMIN — AMLODIPINE BESYLATE 5 MILLIGRAM(S): 2.5 TABLET ORAL at 18:59

## 2023-08-11 NOTE — PROGRESS NOTE ADULT - SUBJECTIVE AND OBJECTIVE BOX
Ortho Note    Pt comfortable without complaints, pain controlled  Denies CP, SOB, N/V, new numbness/tingling     Vital Signs Last 24 Hrs  T(C): 37.1 (08-11-23 @ 04:55), Max: 37.1 (08-11-23 @ 04:55)  T(F): 98.8 (08-11-23 @ 04:55), Max: 98.8 (08-11-23 @ 04:55)  HR: 73 (08-11-23 @ 04:55) (73 - 73)  BP: 155/71 (08-11-23 @ 04:55) (155/71 - 155/71)  BP(mean): --  RR: 17 (08-11-23 @ 04:55) (17 - 17)  SpO2: 98% (08-11-23 @ 04:55) (98% - 98%)  I&O's Summary    09 Aug 2023 07:01  -  10 Aug 2023 07:00  --------------------------------------------------------  IN: 540 mL / OUT: 350 mL / NET: 190 mL    10 Aug 2023 07:01  -  11 Aug 2023 06:38  --------------------------------------------------------  IN: 2210 mL / OUT: 2275 mL / NET: -65 mL        General: Pt Alert and oriented, NAD  B/L LE: sensation intact, DP 2+, brisk cap refill, EHL/FHL/TA/GS 5/5  no shortening, no ER                          12.7   6.84  )-----------( 290      ( 10 Aug 2023 10:14 )             37.0     08-10    134<L>  |  98  |  6<L>  ----------------------------<  88  3.4<L>   |  24  |  0.43<L>    Ca    8.6      10 Aug 2023 23:05  Mg     1.8     08-10    TPro  7.0  /  Alb  3.1<L>  /  TBili  0.5  /  DBili  x   /  AST  46<H>  /  ALT  28  /  AlkPhos  132<H>  08-09      A/P: 73yFemale w/ L hip oa and fnf garden I  - Stable  - Pain Control  - DVT ppx: scds  - PT, WBS: bedrest strict  - Dispo: pending OR. Indicated procedure is L GEOFFREY    Ortho Pager [phone]        Ortho Pager [phone]

## 2023-08-11 NOTE — DISCHARGE NOTE PROVIDER - NSDCMRMEDTOKEN_GEN_ALL_CORE_FT
amLODIPine 5 mg oral tablet: 1 orally once a day  gabapentin 300 mg oral capsule: 1 orally once a day  ibuprofen 600 mg oral tablet: 1 tab(s) orally every 6 hours  Keppra 500 mg oral tablet: 1 orally once a day  rosuvastatin 5 mg oral tablet: 1 orally once a day  sertraline 25 mg oral tablet: 1 orally once a day  Synthroid 50 mcg (0.05 mg) oral tablet: 1 orally  Ultram 50 mg oral tablet: 1 tab(s) orally every 8 hours MDD:3  zolpidem 6.25 mg oral tablet, extended release: 1 orally once a day   amLODIPine 5 mg oral tablet: 1 orally once a day  gabapentin 300 mg oral capsule: 1 orally once a day  ibuprofen 600 mg oral tablet: 1 tab(s) orally every 6 hours  Keppra 500 mg oral tablet: 1 orally once a day  polyethylene glycol 3350 oral powder for reconstitution: 17 gram(s) orally once a day  rosuvastatin 5 mg oral tablet: 1 orally once a day  sertraline 25 mg oral tablet: 1 orally once a day  Synthroid 50 mcg (0.05 mg) oral tablet: 1 orally  Ultram 50 mg oral tablet: 1 tab(s) orally every 8 hours MDD:3  zolpidem 6.25 mg oral tablet, extended release: 1 orally once a day   amLODIPine 5 mg oral tablet: 1 orally once a day  gabapentin 300 mg oral capsule: 1 orally once a day  Keppra 500 mg oral tablet: 1 orally once a day  levothyroxine 50 mcg (0.05 mg) oral tablet: 1 tab(s) orally once a day  oxyCODONE 5 mg oral tablet: 1 tab(s) orally every 4 to 6 hours as needed for  severe pain Take 1-2 tablets as needed every 4-6hours for moderate to severe pain MDD: 10  polyethylene glycol 3350 oral powder for reconstitution: 17 gram(s) orally once a day  rosuvastatin 5 mg oral tablet: 1 orally once a day  sertraline 25 mg oral tablet: 1 orally once a day  Xarelto 10 mg oral tablet: 1 tab(s) orally once a day  zolpidem 6.25 mg oral tablet, extended release: 1 orally once a day

## 2023-08-11 NOTE — PROGRESS NOTE ADULT - NS ATTEND AMEND GEN_ALL_CORE FT
Consult for L GEOFFREY requested by Dr. Garcia  Pt. was seen/ examined  Discussed risks/ benefits/ alternatives of L GEOFFREY versus cannulated screw fixation  Discussed previous history of pain and plan for L GEOFFREY at S  CT reviewed with patient, possibility of AVN with collapse reviewed  Decision was made to proceed to OR for L GEOFFREY  Again discussed risks/ benefits/ alternatives of the procedure  Consent obtained  Side/ site marked

## 2023-08-11 NOTE — PROGRESS NOTE ADULT - SUBJECTIVE AND OBJECTIVE BOX
Ortho Post Op Check    Procedure: Left GEOFFREY  Surgeon: Leland    Pt comfortable without complaints, pain controlled  Denies CP, SOB, N/V, numbness/tingling     Vital Signs Last 24 Hrs  T(C): 36.6 (08-11-23 @ 18:11), Max: 36.8 (08-11-23 @ 16:35)  T(F): 97.8 (08-11-23 @ 18:11), Max: 98.3 (08-11-23 @ 16:35)  HR: 74 (08-11-23 @ 18:11) (64 - 74)  BP: 131/75 (08-11-23 @ 18:11) (131/75 - 151/72)  BP(mean): 97 (08-11-23 @ 17:35) (97 - 104)  RR: 17 (08-11-23 @ 18:11) (14 - 20)  SpO2: 95% (08-11-23 @ 18:11) (93% - 98%)  I&O's Summary    10 Aug 2023 07:01  -  11 Aug 2023 07:00  --------------------------------------------------------  IN: 2210 mL / OUT: 2275 mL / NET: -65 mL    11 Aug 2023 07:01  -  11 Aug 2023 18:52  --------------------------------------------------------  IN: 170 mL / OUT: 300 mL / NET: -130 mL        Physical Exam:  General: Pt Alert and oriented, NAD  DSG C/D/I, prevena holding suction  LLE:  Pulses: 2+ dp, pt pulses, wwp, cap refill <3 sec  Sensation: SILT in sural/saph/sp/dp/tibial distributions  Motor: EHL/FHL/TA/GS  firing                          12.5   5.92  )-----------( 303      ( 11 Aug 2023 07:34 )             37.3     08-11    138  |  99  |  5<L>  ----------------------------<  96  3.1<L>   |  24  |  0.44<L>    Ca    9.6      11 Aug 2023 07:34  Mg     1.8     08-10        Intraoperative fluoroscopy shows hardware in place    A/P: 73yFemale s/p L GEOFFREY with Dr. Ha on 8/11  - Stable  - Pain Control  - f/u AM labs  - DVT ppx: Lovenox 40 QD  - Post op abx: periop ancef  - PT, WBS: WBAT  - Dispo: Pending PT radhames Roblero, PGY2  Orthopaedic Surgery

## 2023-08-11 NOTE — BRIEF OPERATIVE NOTE - NSICDXBRIEFPREOP_GEN_ALL_CORE_FT
PRE-OP DIAGNOSIS:  Fracture of femoral neck, left 11-Aug-2023 17:06:41 Hx of femoral head articular surface collapse Chris Peace

## 2023-08-11 NOTE — PRE-OP CHECKLIST - SELECT TESTS ORDERED
BMP/CBC/PT/PTT/INR/Type and Screen/Urinalysis/EKG/CXR
X-Ray Pelvis, US Pelvis, CT hip/CBC/CMP/Type and Screen/CXR/COVID-19
BMP/CBC/CMP/PT/PTT/INR/Type and Screen/Urinalysis/EKG/CXR

## 2023-08-11 NOTE — DISCHARGE NOTE PROVIDER - HOSPITAL COURSE
Admitted on 8/9/23 with left femoral neck fracture   Admitted under Dr. Garcia, surgical care assumed by Dr. Ha   Surgery: Left Total Hip Replacement   Lilibeth-op Antibiotics  Pain control  DVT prophylaxis: Lovenox preoperatively, Aspirin post operatively   OOB/Physical Therapy  Medicine Consult  Neurology Consult for recent dx of seizure disorder    Hospital Course  8/9 Presented to ED with left hip pain s/p mechanical fall. XR noted for left femoral neck fracture. Patient admitted to Orthopedic Service for further management  8/10: preop planning and medical optimization. Epilepsy consult for recent dx outpatient of seizure disorder. Patient started on Keppra. Hypokalemia repleted   8/11: OR for Left total hip replacement     s/p: Left Total Hip Replacement     ACTIVITY:   - Weight bear as tolerated with assistive device. No strenuous activity, heavy lifting, driving or returning to work until cleared by MD.   - Apply a cold compress to the surgical site several times daily to reduce pain and swelling. For icing, twenty-minute sessions followed by an hour off is recommended.   You should ice as frequently as possible. Ice should not be placed directly on the skin. Wearing compression stockings during the first week after surgery can help reduce swelling in your knee, calf and foot, but is not required.    (ANTERIOR HIP REPLACEMENTS)   Hip precautions:   There are no specific range of motion precautions required with this approach to hip replacement.   A raised toilet seat is not required, although you may find it easier to use one.   You do not need to sleep with a pillow between your legs.     DRESSING/SHOWERING: ****  (AQUACEL – brown gel dressing)   - You may shower, your dressing is water-resistant. Do not soak in bathtubs. Remove dressing after postop day 7, then leave incision open to air. You may do this yourself (simply peel it off), or you may ask for assistance from your visiting nurse. Keep your incision clean and dry. Do not pick at your incision. Do not apply creams, ointments or oils to your incision until cleared by your surgeon. Do not soak your incision in sitting water (ie tubs, pools, lakes, etc.) until cleared by your surgeon. Do not scrub the incision – instead, allow soap and water to flow over the incision and then pat it dry with a clean towel.     (PREVENA or ALISON – incisional wound vac)   - You have an incisional wound vac dressing with tubing to attached canister/battery pack. You may shower but must keep battery pack dry at all times. The battery dies in 7 days then can remove dressing and leave open to air. Keep your incision clean and dry. Do not pick at your incision. Do not apply creams, ointments or oils to your incision until cleared by your surgeon. Do not soak your incision in sitting water (ie tubs, pools, lakes, etc.) until cleared by your surgeon. Do not scrub the incision – instead, allow soap and water to flow over the incision and then pat it dry with a clean towel.     MEDICATION/ANTICOAGULATION:   -You have been prescribed Aspirin, as a preventative to help prevent postoperative blood clots. Please take this medication as prescribed.    - You have been prescribed medications for pain:   - Tylenol for mild to moderate pain. Do not exceed 3,000mg daily.   - For more severe pain, take Tylenol with the addition of narcotic pain medication. Take this medication as prescribed. This medication may cause drowsiness or dizziness. Do not operate machinery. This medication may cause constipation.   - For any additional medications, follow instructions on the bottle.    -Try to have regular bowel movements. Take stool softener or laxative if necessary. You may wish to take Miralax daily until you have regular bowel movements.    - If you have been prescribed Aspirin or an anti-inflammatory, please take prilosec (omeprazole) once a day, before breakfast, until no longer taking Aspirin or anti-inflammatory. This will help protect your stomach.   - If you have a pain management physician, please follow-up with them postoperatively.    - If you experience any negative side effects of your medications, please call your surgeon's office to discuss.     FOLLOW-UP:   - Call to schedule an appt with Dr. Ha for follow up.   - Please follow-up with your primary care physician or any other specialist you see postoperatively, if needed.    - Contact your doctor or go to the emergency room if you experience: fever greater than 101.5, chills, chest pain, difficulty breathing, redness or excessive drainage around the incision, other concerns.     Admitted on 8/9/23 with left femoral neck fracture   Admitted under Dr. Garcia, surgical care assumed by Dr. Ha   Surgery: Left Total Hip Replacement   Lilibeth-op Antibiotics- Ancef x 24 hrs  Pain control  DVT prophylaxis: Lovenox 40mg subq once a day  OOB/Physical Therapy  Medicine Consult  Neurology Consult for recent dx of seizure disorder    Hospital Course  8/9 Presented to ED with left hip pain s/p mechanical fall. XR noted for left femoral neck fracture. Patient admitted to Orthopedic Service for further management  8/10: preop planning and medical optimization. Epilepsy consult for recent dx outpatient of seizure disorder. Patient started on Keppra. Hypokalemia repleted   8/11: OR for Left total hip replacement , BLE dopplers show left peroneal DVT started on lovenox 40mg sub q daily for treatment    s/p: Left Total Hip Replacement     ACTIVITY:   - Weight bear as tolerated with assistive device. No strenuous activity, heavy lifting, driving or returning to work until cleared by MD.   - Apply a cold compress to the surgical site several times daily to reduce pain and swelling. For icing, twenty-minute sessions followed by an hour off is recommended.   You should ice as frequently as possible. Ice should not be placed directly on the skin. Wearing compression stockings during the first week after surgery can help reduce swelling in your knee, calf and foot, but is not required.    (ANTERIOR HIP REPLACEMENTS)   Hip precautions:   There are no specific range of motion precautions required with this approach to hip replacement.   A raised toilet seat is not required, although you may find it easier to use one.   You do not need to sleep with a pillow between your legs.     DRESSING/SHOWERING: ****  (PREVENA – incisional wound vac)   - You have an incisional wound vac dressing with tubing to attached canister/battery pack. You may shower but must keep battery pack dry at all times. The battery dies in 7 days then can remove dressing and leave open to air. Keep your incision clean and dry. Do not pick at your incision. Do not apply creams, ointments or oils to your incision until cleared by your surgeon. Do not soak your incision in sitting water (ie tubs, pools, lakes, etc.) until cleared by your surgeon. Do not scrub the incision – instead, allow soap and water to flow over the incision and then pat it dry with a clean towel.     MEDICATION/ANTICOAGULATION:   -You have been prescribed Lovenox 40mg subq daily as a preventative to help prevent postoperative blood clots. Please take this medication as prescribed.    - You have been prescribed medications for pain:   - Tylenol for mild to moderate pain. Do not exceed 3,000mg daily.   - For more severe pain, take Tylenol with the addition of narcotic pain medication. Take this medication as prescribed. This medication may cause drowsiness or dizziness. Do not operate machinery. This medication may cause constipation.   - For any additional medications, follow instructions on the bottle.    -Try to have regular bowel movements. Take stool softener or laxative if necessary. You may wish to take Miralax daily until you have regular bowel movements.    - If you have been prescribed Aspirin or an anti-inflammatory, please take pantoprazole once a day, before breakfast, until no longer taking Aspirin or anti-inflammatory. This will help protect your stomach.   - If you have a pain management physician, please follow-up with them postoperatively.    - If you experience any negative side effects of your medications, please call your surgeon's office to discuss.     FOLLOW-UP:   - Call to schedule an appt with Dr. Ha for follow up.   - Please follow-up with your primary care physician or any other specialist you see postoperatively, if needed.    - Contact your doctor or go to the emergency room if you experience: fever greater than 101.5, chills, chest pain, difficulty breathing, redness or excessive drainage around the incision, other concerns.

## 2023-08-11 NOTE — DISCHARGE NOTE PROVIDER - NSDCACTIVITY_GEN_ALL_CORE
Return to Work/School allowed/Showering allowed/Walking - Outdoors allowed/Follow Instructions Provided by your Surgical Team

## 2023-08-11 NOTE — DISCHARGE NOTE PROVIDER - CARE PROVIDER_API CALL
Keron Ha  Orthopaedic Surgery  [address] [address]  Phone: [phone]  Fax: [phone]  Follow Up Time: 2 weeks

## 2023-08-12 VITALS
DIASTOLIC BLOOD PRESSURE: 68 MMHG | OXYGEN SATURATION: 95 % | RESPIRATION RATE: 16 BRPM | SYSTOLIC BLOOD PRESSURE: 129 MMHG | HEART RATE: 84 BPM | TEMPERATURE: 99 F

## 2023-08-12 LAB
ANION GAP SERPL CALC-SCNC: 12 MMOL/L — SIGNIFICANT CHANGE UP (ref 5–17)
BUN SERPL-MCNC: 7 MG/DL — SIGNIFICANT CHANGE UP (ref 7–23)
CALCIUM SERPL-MCNC: 9.9 MG/DL — SIGNIFICANT CHANGE UP (ref 8.4–10.5)
CHLORIDE SERPL-SCNC: 101 MMOL/L — SIGNIFICANT CHANGE UP (ref 96–108)
CO2 SERPL-SCNC: 27 MMOL/L — SIGNIFICANT CHANGE UP (ref 22–31)
CREAT SERPL-MCNC: 0.43 MG/DL — LOW (ref 0.5–1.3)
EGFR: 104 ML/MIN/1.73M2 — SIGNIFICANT CHANGE UP
GLUCOSE SERPL-MCNC: 154 MG/DL — HIGH (ref 70–99)
HCT VFR BLD CALC: 33.7 % — LOW (ref 34.5–45)
HGB BLD-MCNC: 11.6 G/DL — SIGNIFICANT CHANGE UP (ref 11.5–15.5)
MCHC RBC-ENTMCNC: 34.4 GM/DL — SIGNIFICANT CHANGE UP (ref 32–36)
MCHC RBC-ENTMCNC: 35 PG — HIGH (ref 27–34)
MCV RBC AUTO: 101.8 FL — HIGH (ref 80–100)
NRBC # BLD: 0 /100 WBCS — SIGNIFICANT CHANGE UP (ref 0–0)
PLATELET # BLD AUTO: 297 K/UL — SIGNIFICANT CHANGE UP (ref 150–400)
POTASSIUM SERPL-MCNC: 3.3 MMOL/L — LOW (ref 3.5–5.3)
POTASSIUM SERPL-SCNC: 3.3 MMOL/L — LOW (ref 3.5–5.3)
RBC # BLD: 3.31 M/UL — LOW (ref 3.8–5.2)
RBC # FLD: 12.1 % — SIGNIFICANT CHANGE UP (ref 10.3–14.5)
SODIUM SERPL-SCNC: 140 MMOL/L — SIGNIFICANT CHANGE UP (ref 135–145)
WBC # BLD: 11.55 K/UL — HIGH (ref 3.8–10.5)
WBC # FLD AUTO: 11.55 K/UL — HIGH (ref 3.8–10.5)

## 2023-08-12 PROCEDURE — 85730 THROMBOPLASTIN TIME PARTIAL: CPT

## 2023-08-12 PROCEDURE — 85027 COMPLETE CBC AUTOMATED: CPT

## 2023-08-12 PROCEDURE — 72170 X-RAY EXAM OF PELVIS: CPT

## 2023-08-12 PROCEDURE — 83735 ASSAY OF MAGNESIUM: CPT

## 2023-08-12 PROCEDURE — 82306 VITAMIN D 25 HYDROXY: CPT

## 2023-08-12 PROCEDURE — 85025 COMPLETE CBC W/AUTO DIFF WBC: CPT

## 2023-08-12 PROCEDURE — 84436 ASSAY OF TOTAL THYROXINE: CPT

## 2023-08-12 PROCEDURE — 82310 ASSAY OF CALCIUM: CPT

## 2023-08-12 PROCEDURE — 87635 SARS-COV-2 COVID-19 AMP PRB: CPT

## 2023-08-12 PROCEDURE — 86900 BLOOD TYPING SEROLOGIC ABO: CPT

## 2023-08-12 PROCEDURE — 86803 HEPATITIS C AB TEST: CPT

## 2023-08-12 PROCEDURE — 99232 SBSQ HOSP IP/OBS MODERATE 35: CPT

## 2023-08-12 PROCEDURE — C1776: CPT

## 2023-08-12 PROCEDURE — 73700 CT LOWER EXTREMITY W/O DYE: CPT

## 2023-08-12 PROCEDURE — C1889: CPT

## 2023-08-12 PROCEDURE — 97116 GAIT TRAINING THERAPY: CPT

## 2023-08-12 PROCEDURE — 81001 URINALYSIS AUTO W/SCOPE: CPT

## 2023-08-12 PROCEDURE — 97161 PT EVAL LOW COMPLEX 20 MIN: CPT

## 2023-08-12 PROCEDURE — 76000 FLUOROSCOPY <1 HR PHYS/QHP: CPT

## 2023-08-12 PROCEDURE — 71045 X-RAY EXAM CHEST 1 VIEW: CPT

## 2023-08-12 PROCEDURE — 73502 X-RAY EXAM HIP UNI 2-3 VIEWS: CPT

## 2023-08-12 PROCEDURE — 85610 PROTHROMBIN TIME: CPT

## 2023-08-12 PROCEDURE — 80048 BASIC METABOLIC PNL TOTAL CA: CPT

## 2023-08-12 PROCEDURE — 36415 COLL VENOUS BLD VENIPUNCTURE: CPT

## 2023-08-12 PROCEDURE — 86850 RBC ANTIBODY SCREEN: CPT

## 2023-08-12 PROCEDURE — 83970 ASSAY OF PARATHORMONE: CPT

## 2023-08-12 PROCEDURE — 86901 BLOOD TYPING SEROLOGIC RH(D): CPT

## 2023-08-12 PROCEDURE — 93970 EXTREMITY STUDY: CPT

## 2023-08-12 PROCEDURE — 76856 US EXAM PELVIC COMPLETE: CPT

## 2023-08-12 PROCEDURE — 84443 ASSAY THYROID STIM HORMONE: CPT

## 2023-08-12 PROCEDURE — C1713: CPT

## 2023-08-12 PROCEDURE — 80053 COMPREHEN METABOLIC PANEL: CPT

## 2023-08-12 PROCEDURE — 88304 TISSUE EXAM BY PATHOLOGIST: CPT

## 2023-08-12 PROCEDURE — 99285 EMERGENCY DEPT VISIT HI MDM: CPT

## 2023-08-12 RX ORDER — LEVOTHYROXINE SODIUM 125 MCG
1 TABLET ORAL
Qty: 0 | Refills: 0 | DISCHARGE
Start: 2023-08-12

## 2023-08-12 RX ORDER — POLYETHYLENE GLYCOL 3350 17 G/17G
17 POWDER, FOR SOLUTION ORAL
Qty: 0 | Refills: 0 | DISCHARGE
Start: 2023-08-12

## 2023-08-12 RX ORDER — LEVOTHYROXINE SODIUM 125 MCG
1 TABLET ORAL
Refills: 0 | DISCHARGE

## 2023-08-12 RX ORDER — RIVAROXABAN 15 MG-20MG
1 KIT ORAL
Qty: 35 | Refills: 0
Start: 2023-08-12 | End: 2023-09-15

## 2023-08-12 RX ORDER — OXYCODONE HYDROCHLORIDE 5 MG/1
1 TABLET ORAL
Qty: 30 | Refills: 0
Start: 2023-08-12 | End: 2023-08-18

## 2023-08-12 RX ADMIN — OXYCODONE HYDROCHLORIDE 5 MILLIGRAM(S): 5 TABLET ORAL at 00:18

## 2023-08-12 RX ADMIN — Medication 1 TABLET(S): at 13:19

## 2023-08-12 RX ADMIN — Medication 50 MICROGRAM(S): at 05:32

## 2023-08-12 RX ADMIN — OXYCODONE HYDROCHLORIDE 5 MILLIGRAM(S): 5 TABLET ORAL at 16:18

## 2023-08-12 RX ADMIN — LEVETIRACETAM 750 MILLIGRAM(S): 250 TABLET, FILM COATED ORAL at 05:32

## 2023-08-12 RX ADMIN — AMLODIPINE BESYLATE 5 MILLIGRAM(S): 2.5 TABLET ORAL at 05:32

## 2023-08-12 RX ADMIN — OXYCODONE HYDROCHLORIDE 5 MILLIGRAM(S): 5 TABLET ORAL at 15:18

## 2023-08-12 RX ADMIN — OXYCODONE HYDROCHLORIDE 5 MILLIGRAM(S): 5 TABLET ORAL at 10:47

## 2023-08-12 RX ADMIN — POLYETHYLENE GLYCOL 3350 17 GRAM(S): 17 POWDER, FOR SOLUTION ORAL at 13:18

## 2023-08-12 RX ADMIN — PANTOPRAZOLE SODIUM 40 MILLIGRAM(S): 20 TABLET, DELAYED RELEASE ORAL at 05:44

## 2023-08-12 RX ADMIN — Medication 100 MILLIGRAM(S): at 05:32

## 2023-08-12 RX ADMIN — OXYCODONE HYDROCHLORIDE 5 MILLIGRAM(S): 5 TABLET ORAL at 09:47

## 2023-08-12 NOTE — DISCHARGE NOTE NURSING/CASE MANAGEMENT/SOCIAL WORK - NSDCVIVACCINE_GEN_ALL_CORE_FT
[de-identified] : DEMIAN MATHIAS is a 17 year old, female seen on 1/4/2122 for  CVID.\par \par Pt was treated for pneumonia in Sept 2021 and has episodic infections every 3-4 weeks, usually requriign antibiotics.  Her IgG levels have been > 800 mg/dl for several years now.   \par \par Pt's last IVIG was 11/24/2021.\par \par Symptoms seem to include: fatigue, and overall exaustion.  \par After an infusion, no longer does well for very long.  After 1 weeks, she has generlized malaise, body aches.\par \par She has been getting various URIs since November on and off, despite IgG levels above 800 mg/dl. \par \par Pt has incomplete bladder emptying with urinary reflex and hydronephrois (R).  Pt is following with Dr. Elmore currently.\par \par Previously, the pt was doing great on IVIG for several years without symptoms and things have progressively detereated over the past 3 years.  \par \par In School, she's doing well, make honor roll. Mom is interested in home schooling due to frequent infections.  The school nurse calls often and sends Demian home due to symptoms.\par \par Pt was just in the ED at [facility], and she was treated for constipation in the ED and then discharged.  \par \par F/U scheduled with GI and Peds Urology scheduled and pending.  \par \par Pt had 2 doses of COVID19 pfizer. No booster yet. \par \par Pt developed cough, nasal congestion, muscle pain, no headache, no fever about 2 weeks ago.  Symtpoms have peristed and neigher improved or worstened.  No effect with receiving IVIG on 1/3/2021 so far. \par \par  Tdap; 16-Sep-2019 13:50; Arnoldo Nam); [employer]; [id number] (Exp. Date: 26-Jul-2021); IntraMuscular; Deltoid Right.; 0.5 milliLiter(s); VIS (VIS Published: 09-May-2013, VIS Presented: 16-Sep-2019);

## 2023-08-12 NOTE — PROGRESS NOTE ADULT - SUBJECTIVE AND OBJECTIVE BOX
Ortho Floor Note    Procedure: Left GEOFFREY  Surgeon: Leland    Pt comfortable without complaints, pain controlled  Denies CP, SOB, N/V, numbness/tingling     Vital Signs Last 24 Hrs  T(C): 36.6 (12 Aug 2023 05:30), Max: 36.8 (11 Aug 2023 09:32)  T(F): 97.9 (12 Aug 2023 05:30), Max: 98.3 (11 Aug 2023 09:32)  HR: 76 (12 Aug 2023 05:30) (64 - 76)  BP: 130/65 (12 Aug 2023 05:30) (119/68 - 151/72)  BP(mean): 88 (11 Aug 2023 23:58) (88 - 105)  RR: 18 (12 Aug 2023 05:30) (14 - 20)  SpO2: 96% (12 Aug 2023 05:30) (92% - 98%)    Parameters below as of 12 Aug 2023 05:30  Patient On (Oxygen Delivery Method): room air          Physical Exam:  General: Pt Alert and oriented, NAD  DSG C/D/I, prevena holding suction  LLE:  Pulses: 2+ dp, pt pulses, wwp, cap refill <3 sec  Sensation: SILT in sural/saph/sp/dp/tibial distributions  Motor: EHL/FHL/TA/GS  firing                                     12.5   5.92  )-----------( 303      ( 11 Aug 2023 07:34 )             37.3     08-11    138  |  99  |  5<L>  ----------------------------<  96  3.1<L>   |  24  |  0.44<L>    Ca    9.6      11 Aug 2023 07:34  Mg     1.8     08-10            Intraoperative fluoroscopy shows hardware in place    A/P: 73yFemale s/p L GEOFFREY with Dr. Ha on 8/11  - Stable  - Pain Control  - f/u AM labs  - DVT ppx: Lovenox 40 QD  - Post op abx: periop ancef  - PT, WBS: WBAT  - Dispo: Pending PT eval

## 2023-08-12 NOTE — PROGRESS NOTE ADULT - SUBJECTIVE AND OBJECTIVE BOX
SUBJECTIVE / INTERVAL HPI: Patient seen and examined at bedside. Pain generally well controlled.  Reports high level of satisfaction with her care at [facility] so far; eager to work with PT    VITAL SIGNS:  Vital Signs Last 24 Hrs  T(C): 36.7 (12 Aug 2023 08:24), Max: 36.8 (11 Aug 2023 16:35)  T(F): 98 (12 Aug 2023 08:24), Max: 98.3 (11 Aug 2023 16:35)  HR: 80 (12 Aug 2023 08:24) (64 - 80)  BP: 138/72 (12 Aug 2023 08:24) (119/68 - 151/72)  BP(mean): 94 (12 Aug 2023 08:24) (88 - 104)  RR: 17 (12 Aug 2023 08:24) (14 - 20)  SpO2: 97% (12 Aug 2023 08:24) (92% - 98%)    Parameters below as of 12 Aug 2023 08:24  Patient On (Oxygen Delivery Method): room air        08-11-23 @ 07:01  -  08-12-23 @ 07:00  --------------------------------------------------------  IN: 170 mL / OUT: 450 mL / NET: -280 mL        PHYSICAL EXAM:    General: well appearing female. nad  HEENT: NC/AT; PERRL, anicteric sclera; MMM  Neck: supple  Cardiovascular: +S1/S2; RRR  Respiratory: CTA B/L; no W/R/R  Gastrointestinal: soft, NT/ND; +BSx4  Extremities: WWP; no edema, clubbing or cyanosis  Vascular: 2+ radial, DP/PT pulses B/L  Neurological: AAOx3; no focal deficits    MEDICATIONS:  MEDICATIONS  (STANDING):  amLODIPine   Tablet 5 milliGRAM(s) Oral daily  atorvastatin 20 milliGRAM(s) Oral at bedtime  enoxaparin Injectable 40 milliGRAM(s) SubCutaneous every 24 hours  gabapentin 300 milliGRAM(s) Oral daily  lactated ringers. 1000 milliLiter(s) (70 mL/Hr) IV Continuous <Continuous>  levETIRAcetam 750 milliGRAM(s) Oral two times a day  levothyroxine 50 MICROGram(s) Oral daily  multivitamin 1 Tablet(s) Oral daily  pantoprazole    Tablet 40 milliGRAM(s) Oral before breakfast  polyethylene glycol 3350 17 Gram(s) Oral daily  senna 2 Tablet(s) Oral at bedtime  sertraline 25 milliGRAM(s) Oral daily    MEDICATIONS  (PRN):  HYDROmorphone  Injectable 0.5 milliGRAM(s) IV Push every 6 hours PRN Breakthrough Pain  oxyCODONE    IR 5 milliGRAM(s) Oral every 4 hours PRN Mild Pain (1 - 3)  oxyCODONE    IR 10 milliGRAM(s) Oral every 4 hours PRN Moderate Pain (4 - 6)  zaleplon 5 milliGRAM(s) Oral at bedtime PRN Insomnia      ALLERGIES:  Allergies    sulfa drugs (Unknown)  penicillin (Unknown)    Intolerances        LABS:                        12.5   5.92  )-----------( 303      ( 11 Aug 2023 07:34 )             37.3     08-11    138  |  99  |  5<L>  ----------------------------<  96  3.1<L>   |  24  |  0.44<L>    Ca    9.6      11 Aug 2023 07:34        Urinalysis Basic - ( 11 Aug 2023 07:34 )    Color: x / Appearance: x / SG: x / pH: x  Gluc: 96 mg/dL / Ketone: x  / Bili: x / Urobili: x   Blood: x / Protein: x / Nitrite: x   Leuk Esterase: x / RBC: x / WBC x   Sq Epi: x / Non Sq Epi: x / Bacteria: x      CAPILLARY BLOOD GLUCOSE            RADIOLOGY & ADDITIONAL TESTS: Reviewed.    ASSESSMENT:    PLAN:

## 2023-08-12 NOTE — CHART NOTE - NSCHARTNOTEFT_GEN_A_CORE
Was called by Orthopedic team to discuss anticoagulation regimen for patient following GEOFFREY procedure. Patient is 73 years old, has normal renal function. Regimen for DVT prophylaxis include Xeralto 10 mg daily vs. Eliquis 2.5 mg BID for 35 days duration.   Orthopedic team may choose duration of anticoagulation based on in-house protocol or attending preference.     Thank you for the courtesy of your consult.

## 2023-08-12 NOTE — PROGRESS NOTE ADULT - SUBJECTIVE AND OBJECTIVE BOX
Epilepsy Progress  Note    Interval history:    HOSPITAL COURSE:   73F. PMH: hypothyroidism, depression, hld, htn, presents with L hip pain after a fall,    Admitted 8/9 for a femoral neck fracture. She says since May she has had 3 falls in the middle of the night. She denies any LOC but says she doesn't remember the episodes. She says her  heard a "thump" came and found her on the floor. She denies any confusion after the falls. She said the first fall in March, she had some neck and shoulder pain, had imaging done and was told it was negative. She said after the second fall in May, she had urinary and bowel incontinence which is new to her. She saw her neurologist, Dr Fredo Hooper, who did imaging of her brain and a EEG. She says he called her recently and told her the vEEG was abnormal and that she should start Keppra 750mg BID which she hadn't started yet. She says the 3rd fall happened on 8/6. She doesn't recall falling and says she woke up in bed with significant L hip pain which is why she came in. Her social history is significant for alcohol use, she used to drink 2 glasses of vodka on the rocks a night, but after the 2nd fall cut down to 1 glass of wine a day. She denies any history of alcohol withdrawal, no nausea, vomiting, irritation, tremor. Epilepsy consulted for optimization pre-op.  8/10: Keppra loading dose given 1500mg, then started on 750mg BID   S/p Total hip replacement 8/11     ROS  As above, otherwise negative for constitutional/HEENT/CV/pulm/GI//MSK/neuro/derm/endocrine/psych.     MEDICATIONS  (STANDING):  amLODIPine   Tablet 5 milliGRAM(s) Oral daily  atorvastatin 20 milliGRAM(s) Oral at bedtime  enoxaparin Injectable 40 milliGRAM(s) SubCutaneous every 24 hours  gabapentin 300 milliGRAM(s) Oral daily  lactated ringers. 1000 milliLiter(s) (70 mL/Hr) IV Continuous <Continuous>  levETIRAcetam 750 milliGRAM(s) Oral two times a day  levothyroxine 50 MICROGram(s) Oral daily  multivitamin 1 Tablet(s) Oral daily  pantoprazole    Tablet 40 milliGRAM(s) Oral before breakfast  polyethylene glycol 3350 17 Gram(s) Oral daily  senna 2 Tablet(s) Oral at bedtime  sertraline 25 milliGRAM(s) Oral daily    MEDICATIONS  (PRN):  HYDROmorphone  Injectable 0.5 milliGRAM(s) IV Push every 6 hours PRN Breakthrough Pain  oxyCODONE    IR 5 milliGRAM(s) Oral every 4 hours PRN Mild Pain (1 - 3)  oxyCODONE    IR 10 milliGRAM(s) Oral every 4 hours PRN Moderate Pain (4 - 6)  zaleplon 5 milliGRAM(s) Oral at bedtime PRN Insomnia      T(C): 36.7 (08-12-23 @ 08:24), Max: 36.8 (08-11-23 @ 16:35)  HR: 80 (08-12-23 @ 08:24) (64 - 80)  BP: 138/72 (08-12-23 @ 08:24) (119/68 - 151/72)  RR: 17 (08-12-23 @ 08:24) (14 - 20)  SpO2: 97% (08-12-23 @ 08:24) (92% - 98%)  Wt(kg): --    General:  Constitutional:  Sitting comfortably in NAD.  Ears, Nose, Throat: no abnormalities, mucus membranes moist  Neck: supple, no lymphadenopathy  Extremities: no edema, clubbing or cyanosis  Skin: no rash or neurocutaneous signs     Cognitive:  Orientation, language, memory and knowledge screens intact.    Cranial Nerves:  II: ALEXA. III/IV/VI: EOM Full.  Absent nystagmus  V1V2V3: Symmetric, VII: Face appears symmetric VIII: Normal to screening, IX/X: Palate Elevates Symmetrical  XI: Trapezius Symmetric  XII: Tongue midline  Motor:  Power: no pronator drift, power 5/5 b/l UE and LE  Tone: normal x 4 limbs  No tremor  Coordination/Gait: Finger-nose intact, normal finger taps and rapid-alternating movements,   Reflexes:  DTR: 2+ symmetric all 4 limbs, no clonus      Investigations:  CBC Full  -  ( 11 Aug 2023 07:34 )  WBC Count : 5.92 K/uL  RBC Count : 3.60 M/uL  Hemoglobin : 12.5 g/dL  Hematocrit : 37.3 %  Platelet Count - Automated : 303 K/uL  Mean Cell Volume : 103.6 fl  Mean Cell Hemoglobin : 34.7 pg  Mean Cell Hemoglobin Concentration : 33.5 gm/dL  Auto Neutrophil # : x  Auto Lymphocyte # : x  Auto Monocyte # : x  Auto Eosinophil # : x  Auto Basophil # : x  Auto Neutrophil % : x  Auto Lymphocyte % : x  Auto Monocyte % : x  Auto Eosinophil % : x  Auto Basophil % : x    08-11    138  |  99  |  5<L>  ----------------------------<  96  3.1<L>   |  24  |  0.44<L>    Ca    9.6      11 Aug 2023 07:34              EEG: Epilepsy Progress  Note    Interval history:    HOSPITAL COURSE:   73F. PMH: hypothyroidism, depression, hld, htn, presents with L hip pain after a fall,    Admitted 8/9 for a femoral neck fracture. She says since May she has had 3 falls in the middle of the night. She denies any LOC but says she doesn't remember the episodes. She says her  heard a "thump" came and found her on the floor. She denies any confusion after the falls. She said the first fall in March, she had some neck and shoulder pain, had imaging done and was told it was negative. She said after the second fall in May, she had urinary and bowel incontinence which is new to her. She saw her neurologist, Dr Fredo Hooper, who did imaging of her brain and a EEG. She says he called her recently and told her the vEEG was abnormal and that she should start Keppra 750mg BID which she hadn't started yet. She says the 3rd fall happened on 8/6. She doesn't recall falling and says she woke up in bed with significant L hip pain which is why she came in. Her social history is significant for alcohol use, she used to drink 2 glasses of vodka on the rocks a night, but after the 2nd fall cut down to 1 glass of wine a day. She denies any history of alcohol withdrawal, no nausea, vomiting, irritation, tremor. Epilepsy consulted for optimization pre-op.  8/10: Keppra loading dose given 1500mg, then started on 750mg BID  8/11: Underwent Total hip replacement   8/12: Pt reports she is feeling well this AM.     ROS  As above, otherwise negative for constitutional/HEENT/CV/pulm/GI//MSK/neuro/derm/endocrine/psych.     MEDICATIONS  (STANDING):  amLODIPine   Tablet 5 milliGRAM(s) Oral daily  atorvastatin 20 milliGRAM(s) Oral at bedtime  enoxaparin Injectable 40 milliGRAM(s) SubCutaneous every 24 hours  gabapentin 300 milliGRAM(s) Oral daily  lactated ringers. 1000 milliLiter(s) (70 mL/Hr) IV Continuous <Continuous>  levETIRAcetam 750 milliGRAM(s) Oral two times a day  levothyroxine 50 MICROGram(s) Oral daily  multivitamin 1 Tablet(s) Oral daily  pantoprazole    Tablet 40 milliGRAM(s) Oral before breakfast  polyethylene glycol 3350 17 Gram(s) Oral daily  senna 2 Tablet(s) Oral at bedtime  sertraline 25 milliGRAM(s) Oral daily    MEDICATIONS  (PRN):  HYDROmorphone  Injectable 0.5 milliGRAM(s) IV Push every 6 hours PRN Breakthrough Pain  oxyCODONE    IR 5 milliGRAM(s) Oral every 4 hours PRN Mild Pain (1 - 3)  oxyCODONE    IR 10 milliGRAM(s) Oral every 4 hours PRN Moderate Pain (4 - 6)  zaleplon 5 milliGRAM(s) Oral at bedtime PRN Insomnia      T(C): 36.7 (08-12-23 @ 08:24), Max: 36.8 (08-11-23 @ 16:35)  HR: 80 (08-12-23 @ 08:24) (64 - 80)  BP: 138/72 (08-12-23 @ 08:24) (119/68 - 151/72)  RR: 17 (08-12-23 @ 08:24) (14 - 20)  SpO2: 97% (08-12-23 @ 08:24) (92% - 98%)  Wt(kg): --    PHYSICAL EXAM:   Mental Status: A&Ox3  CN: EOMI, normal sensation in V1, V2, and V3 segments bilaterally, very mild facial asymmetry w/ weakness on R lower face (pt reports this has been chronic for some time), normal hearing to speech, tongue midline  Motor: 5/5 strength in the upper extremities. 5/5 strength in the proximal and distal RLE. 4/5 strength in the distal LLE 2/2 pain, deferred strength testing in the proximal LLE.  Sensory: Bilateral intact to light touch    Investigations:  CBC Full  -  ( 11 Aug 2023 07:34 )  WBC Count : 5.92 K/uL  RBC Count : 3.60 M/uL  Hemoglobin : 12.5 g/dL  Hematocrit : 37.3 %  Platelet Count - Automated : 303 K/uL  Mean Cell Volume : 103.6 fl  Mean Cell Hemoglobin : 34.7 pg  Mean Cell Hemoglobin Concentration : 33.5 gm/dL  Auto Neutrophil # : x  Auto Lymphocyte # : x  Auto Monocyte # : x  Auto Eosinophil # : x  Auto Basophil # : x  Auto Neutrophil % : x  Auto Lymphocyte % : x  Auto Monocyte % : x  Auto Eosinophil % : x  Auto Basophil % : x    08-11    138  |  99  |  5<L>  ----------------------------<  96  3.1<L>   |  24  |  0.44<L>    Ca    9.6      11 Aug 2023 07:34              EEG:

## 2023-08-12 NOTE — DISCHARGE NOTE NURSING/CASE MANAGEMENT/SOCIAL WORK - PATIENT PORTAL LINK FT
You can access the FollowMyHealth Patient Portal offered by [facility] by registering at the following website: [email or web address]. By joining [employer]’s FollowMyHealth portal, you will also be able to view your health information using other applications (apps) compatible with our system.

## 2023-08-12 NOTE — PROGRESS NOTE ADULT - ASSESSMENT
73F. PMH: hypothyroidism, depression, hld, htn, presents with L hip pain after a fall,    Admitted 8/9 for a femoral neck fracture. She says since May she has had 3 falls in the middle of the night. She denies any LOC but says she doesn't remember the episodes. She says her  heard a "thump" came and found her on the floor. She denies any confusion after the falls. She said the first fall in March, she had some neck and shoulder pain, had imaging done and was told it was negative. She said after the second fall in May, she had urinary and bowel incontinence which is new to her. She saw her neurologist, Dr Fredo Hooper, who did imaging of her brain and a EEG. She says he called her recently and told her the vEEG was abnormal and that she should start Keppra 750mg BID which she hadn't started yet. She says the 3rd fall happened on 8/6. She doesn't recall falling and says she woke up in bed with significant L hip pain which is why she came in. Her social history is significant for alcohol use, she used to drink 2 glasses of vodka on the rocks a night, but after the 2nd fall cut down to 1 glass of wine a day. She denies any history of alcohol withdrawal, no nausea, vomiting, irritation, tremor. Epilepsy consulted for optimization pre-op.    Collateral per outpatient Neurologist (Dr Yifan Boone)  -vEEG (8/01/2023): Recorded in sleep & awake states. "Temporal sharp waves with phase reversals at T3 and T4, left temporal rhythmic theta and a single 3-second transient occured; left fronto-temporal 4 Hz slowing occured. Interpretation: This is an abnormal routing EEG recorded in the awake state. This result is consistent with, but not diagnostic of a generalized seizure disorder. -Yifan Boone MD, Neurologist"  -MR Brain (7/13/2023): "There is mild global cerebral volume loss without a lobar predilection or hydrocephalus. There is mild, patchy periventricular T2/FLAIR hyperintensity in addition to scattered small foci within the subcortical and to a lesser extent deep hemispheric white matter. May reflect mild microvascular ischemia and the sequela of migraines. No acute or early subacute infarct. -Alberto Oneil MD, Radiologist - [facility]"  -MR Angio Brain (7/20/2023): "No stenosis of bilateral ICAs; normal appearance of ECAs. No evidence of dissection. No beading to suggest vasculitis. No extra-axial fluid collection or acute intracranial hemorrhage. No suspicious foci of parenchymal susceptibility identified. No suspicious mass lesion, mass effect or vasogenic edema. Mild scattered paransasal sinus mucosal thickening w/ areas appearing polypoid in nature, notabl within the right maxillary alveolar recess. Partially imaged cervical spondylosis is noted. - Merrill Prince MD, Radiologist -  [facility]"    Impression: Risk of seizure given history of falls, EEG report. As anesthesia could increase risk of having seizure, tx was started preemptively.     Recommendations:  - Seizure & Fall precautions  - Ativan 2mg IVP for seizures > 2mins  - Keep Mg>2 and K >4.   - C/w home keppra 750mg BID on discharge (pt's  was able to  prescription from pharmacy)    Rest of management per primary team.

## 2023-08-12 NOTE — PHYSICAL THERAPY INITIAL EVALUATION ADULT - DID THE PATIENT HAVE SURGERY?
left THR (anterior)/yes Cartilage Graft Text: The defect edges were debeveled with a #15 scalpel blade.  Given the location of the defect, shape of the defect, the fact the defect involved a full thickness cartilage defect a cartilage graft was deemed most appropriate.  An appropriate donor site was identified, cleansed, and anesthetized. The cartilage graft was then harvested and transferred to the recipient site, oriented appropriately and then sutured into place.  The secondary defect was then repaired using a primary closure.

## 2023-08-12 NOTE — PHYSICAL THERAPY INITIAL EVALUATION ADULT - PERTINENT HX OF CURRENT PROBLEM, REHAB EVAL
73yFemale with PMH of hypothyroidism, depression, hld, htn, pos seizures  presents with L hip pain after mechanical fall. Was immediately unable to bear weight on that limb. In ED, x-rays showed [place] I femoral neck fracture. Denies any numbness or tingling. Denies injury elswhere.

## 2023-08-12 NOTE — PHYSICAL THERAPY INITIAL EVALUATION ADULT - GENERAL OBSERVATIONS, REHAB EVAL
Patient received semi-cantrell in bed  in NAD on RA, +SCDs, +Heplock, +Prevena. Cleared by LIBBY Parker. Agreeable to PT.

## 2023-08-12 NOTE — PROGRESS NOTE ADULT - ASSESSMENT
73 year old woman admitted to the hospital after a fall with left femoral neck fracture     Impression and plan   # Left Femoral Neck Fracture   s/p repair    # Given concern for Seizure we should obtain EEG reports and consider Neurology/Epilepsy consult prior to Surgery     # HTN   -Continue Amlodipine     # HLD   - Atorvastatin    # Hypokalemia   - IV replacement     Plan discussed with patient and Orthopedic Team   73 year old woman admitted to the hospital after a fall with left femoral neck fracture, s/p left GEOFFREY on 8/11    Impression and plan   # Left Femoral Neck Fracture   s/p left GEOFFREY on 8/11  -mgmt per ortho    # r/o seizure    # HTN   -Continue Amlodipine     # HLD   - Atorvastatin    # Hypokalemia   - IV replacement     Plan discussed with patient and Orthopedic Team   73 year old woman admitted to the hospital after a fall with left femoral neck fracture, s/p left GEOFFREY on 8/11    Impression and plan   # Left Femoral Neck Fracture   s/p left GEOFFREY on 8/11  -mgmt per ortho    # r/o seizure - f/u epilepsy recs    # HTN   -Continue Amlodipine     # HLD   - Atorvastatin    #hypokalemia  monitor BMP    #abnormal TSH  check FREE T4    Dispo: pending PT eval

## 2023-08-12 NOTE — DISCHARGE NOTE NURSING/CASE MANAGEMENT/SOCIAL WORK - NSDCPEFALRISK_GEN_ALL_CORE
For information on Fall & Injury Prevention, visit: [email or web address] OR  [email or web address] OR  https://www.cdc.gov/steadi/patient.html

## 2023-08-12 NOTE — PHYSICAL THERAPY INITIAL EVALUATION ADULT - ADDITIONAL COMMENTS
Patient lives with spouse in elevator accessible apartment. Does not use any Assistive Devices at baseline. Owns SC, wheelchair, rollator. Reports 3 recent falls in march, may, and august.

## 2023-08-12 NOTE — PROGRESS NOTE ADULT - ATTENDING COMMENTS
Pt. seen/ examined  Today's labs pending  Vitals reviewed  Discussed details of surgery/ postop plan/ prognosis  Planning do D/C home when cleared by PT  Will appreciate medicine team input re PO DVT treatment  Will follow labs
Feeling well this morning, eager to be discharged home.  Tolerating Keppra 750 mg BID without side effects.  Recommend continuing this dose on discharge.  She will follow up with her outpatient neurologist Dr. Acosta.

## 2023-08-17 DIAGNOSIS — Z88.2 ALLERGY STATUS TO SULFONAMIDES: ICD-10-CM

## 2023-08-17 DIAGNOSIS — I82.452 ACUTE EMBOLISM AND THROMBOSIS OF LEFT PERONEAL VEIN: ICD-10-CM

## 2023-08-17 DIAGNOSIS — E78.5 HYPERLIPIDEMIA, UNSPECIFIED: ICD-10-CM

## 2023-08-17 DIAGNOSIS — Z88.0 ALLERGY STATUS TO PENICILLIN: ICD-10-CM

## 2023-08-17 DIAGNOSIS — G40.909 EPILEPSY, UNSPECIFIED, NOT INTRACTABLE, WITHOUT STATUS EPILEPTICUS: ICD-10-CM

## 2023-08-17 DIAGNOSIS — E87.6 HYPOKALEMIA: ICD-10-CM

## 2023-08-17 DIAGNOSIS — M87.9 OSTEONECROSIS, UNSPECIFIED: ICD-10-CM

## 2023-08-17 DIAGNOSIS — S72.002A FRACTURE OF UNSPECIFIED PART OF NECK OF LEFT FEMUR, INITIAL ENCOUNTER FOR CLOSED FRACTURE: ICD-10-CM

## 2023-08-17 DIAGNOSIS — E03.9 HYPOTHYROIDISM, UNSPECIFIED: ICD-10-CM

## 2023-08-17 DIAGNOSIS — I10 ESSENTIAL (PRIMARY) HYPERTENSION: ICD-10-CM
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
## 2023-08-17 DIAGNOSIS — M16.0 BILATERAL PRIMARY OSTEOARTHRITIS OF HIP: ICD-10-CM
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
## 2023-08-17 DIAGNOSIS — F32.9 MAJOR DEPRESSIVE DISORDER, SINGLE EPISODE, UNSPECIFIED: ICD-10-CM

## 2023-08-21 LAB — SURGICAL PATHOLOGY STUDY: SIGNIFICANT CHANGE UP

## 2023-09-23 NOTE — PHYSICAL THERAPY INITIAL EVALUATION ADULT - PATIENT PROFILE REVIEW, REHAB EVAL
Problem: Adult Inpatient Plan of Care  Goal: Plan of Care Review  Outcome: Ongoing, Progressing  Goal: Patient-Specific Goal (Individualized)  Outcome: Ongoing, Progressing  Goal: Absence of Hospital-Acquired Illness or Injury  Outcome: Ongoing, Progressing  Goal: Optimal Comfort and Wellbeing  Outcome: Ongoing, Progressing  Goal: Readiness for Transition of Care  Outcome: Ongoing, Progressing     Problem: Diabetes Comorbidity  Goal: Blood Glucose Level Within Targeted Range  Outcome: Ongoing, Progressing     Problem: Nausea and Vomiting  Goal: Fluid and Electrolyte Balance  Outcome: Ongoing, Progressing     Problem: Pain Acute  Goal: Acceptable Pain Control and Functional Ability  Outcome: Ongoing, Progressing      yes

## 2023-11-12 ENCOUNTER — EMERGENCY (EMERGENCY)
Facility: HOSPITAL | Age: 73
LOS: 1 days | Discharge: HOME | End: 2023-11-12
Attending: EMERGENCY MEDICINE | Admitting: EMERGENCY MEDICINE
Payer: MEDICARE

## 2023-11-12 VITALS
SYSTOLIC BLOOD PRESSURE: 112 MMHG | HEART RATE: 73 BPM | DIASTOLIC BLOOD PRESSURE: 71 MMHG | RESPIRATION RATE: 16 BRPM | TEMPERATURE: 98 F | OXYGEN SATURATION: 95 %

## 2023-11-12 PROCEDURE — 12011 RPR F/E/E/N/L/M 2.5 CM/<: CPT

## 2023-11-12 PROCEDURE — 99284 EMERGENCY DEPT VISIT MOD MDM: CPT | Mod: 25

## 2023-11-12 PROCEDURE — 70450 CT HEAD/BRAIN W/O DYE: CPT | Mod: 26

## 2023-11-12 NOTE — ED ADULT NURSE NOTE - OBJECTIVE STATEMENT
pt presents to ed for mechanical fall with head strike. recently had hip surgery x  2 months ago. dried blood noted to right ear. takes a/c currently for hip

## 2023-11-12 NOTE — ED PROVIDER NOTE - PATIENT PORTAL LINK FT
You can access the FollowMyHealth Patient Portal offered by [facility] by registering at the following website: [email or web address]. By joining [employer]’s FollowMyHealth portal, you will also be able to view your health information using other applications (apps) compatible with our system.

## 2023-11-12 NOTE — ED PROVIDER NOTE - PHYSICAL EXAMINATION
VITAL SIGNS: I have reviewed nursing notes and confirm.  CONSTITUTIONAL: Well-developed; well-nourished; in no acute distress.  SKIN: Skin exam is warm and dry, no acute rash.  HEAD: Normocephalic; + R parietal scalp ecchymoses  EYES: PERRL, EOM intact; conjunctiva and sclera clear.  ENT: No nasal discharge; airway clear. + 2cm laceration to R ear antihelix w/out exposure of cartilage or FB  NECK: Supple; non tender.  CARD: RRR  RESP: Unlabored. No wheezes, rales or rhonchi.  EXT: Normal ROM.    NEURO: Alert, oriented. Grossly unremarkable.  PSYCH: Cooperative, appropriate.

## 2023-11-12 NOTE — ED ADULT NURSE NOTE - NSFALLRISKINTERV_ED_ALL_ED

## 2023-11-12 NOTE — ED ADULT TRIAGE NOTE - CHIEF COMPLAINT QUOTE
Pt walked in c/o head injury s/p fall. Recent hip surgery, on Xarelto. Arrives with lac to R ear and swelling to R temple. Denies loc, n/v. Bleeding has subsided.

## 2023-11-12 NOTE — ED PROVIDER NOTE - CLINICAL SUMMARY MEDICAL DECISION MAKING FREE TEXT BOX
GCS 15, VSS, prelim read CT negative, asking for d/c home after ear laceration repair (good approximation, sterile dressing and pressure dressing applied), will d/c home with explicit return precautions and anticipatory guidance.

## 2023-11-12 NOTE — ED PROVIDER NOTE - OBJECTIVE STATEMENT
72-year-old status post hip replacement 2 months ago on Xarelto presents with right ureter laceration and head strike after hitting her head on a piece of furniture at home.  No loss of consciousness.  Bleeding has been self-limited.  No nausea, vomiting, headache, numbness, tingling or weakness.  No neck pain or other injury.

## 2023-11-12 NOTE — ED PROVIDER NOTE - NSFOLLOWUPINSTRUCTIONS_ED_ALL_ED_FT
Care For Your Absorbable Stitches    WHAT YOU NEED TO KNOW:    What are absorbable stitches? Absorbable stitches, or sutures, are used to close cuts or wounds. These stitches are absorbed by your body, or fall off on their own within days or weeks. They do not need to be removed.      How do I care for my absorbable stitches?    Protect the stitches. Wear protective clothing over the stitches, and protect the area from sunlight. Do not place pressure on your wound. This could open your wound and increase your risk for an infection.    Clean your wound as directed. Carefully wash the wound with soap and water. Gently dry the area and put on new, clean bandages as directed. Change your bandages when they get wet or dirty. Check your wound for signs of infection when you clean it. Signs include redness, swelling, and pus.    Keep the area dry as directed. Wait 12 to 24 hours after you receive your stitches before you take a shower. Take showers instead of baths. Do not take a bath or swim. Your healthcare provider will give you instructions for bathing with your stitches.  What can I do to help my wound heal?    Elevate your wound. Keep your wound above the level of your heart as often as you can. This will help decrease swelling and pain. Prop the area on pillows or blankets, if possible, to keep it elevated comfortably.    Limit activity. Do not stretch the skin around your wound. This will help prevent bleeding and swelling.  When should I seek immediate care?    Your wound comes apart.    You have red streaks around your wound.    You have swollen or painful lymph nodes.    Blood soaks through your bandage.  When should I contact my healthcare provider?    You have signs of an infection, such as increased redness, pain, swelling, or pus.    You have a fever.    Your stitches do not absorb when your healthcare provider says they should.    You have questions or concerns about your condition or care.      Head Injury, Adult    There are many types of head injuries. Head injuries can be as minor as a small bump, or they can be a serious medical issue. More severe head injuries include:  A jarring injury to the brain (concussion).  A bruise (contusion) of the brain. This means there is bleeding in the brain that can cause swelling.  A cracked skull (skull fracture).  Bleeding in the brain that collects, clots, and forms a bump (hematoma).  After a head injury, most problems occur within the first 24 hours, but side effects may occur up to 7–10 days after the injury. It is important to watch your condition for any changes. You may need to be observed in the emergency department or urgent care, or you may be admitted to the hospital.    What are the causes?  There are many possible causes of a head injury. Serious head injuries may be caused by car accidents, bicycle or motorcycle accidents, sports injuries, falls, or being struck by an object.    What are the symptoms?  Symptoms of a head injury include a contusion, bump, or bleeding at the site of the injury. Other physical symptoms may include:  Headache.  Nausea or vomiting.  Dizziness.  Blurred or double vision.  Being uncomfortable around bright lights or loud noises.  Seizures.  Feeling tired.  Trouble being awakened.  Loss of consciousness.  Mental or emotional symptoms may include:  Irritability.  Confusion and memory problems.  Poor attention and concentration.  Changes in eating or sleeping habits.  Anxiety or depression.  How is this diagnosed?  This condition can usually be diagnosed based on your symptoms, a description of the injury, and a physical exam. You may also have imaging tests done, such as a CT scan or an MRI.    How is this treated?  Treatment for this condition depends on the severity and type of injury you have. The main goal of treatment is to prevent complications and allow the brain time to heal.    Mild head injury    If you have a mild head injury, you may be sent home, and treatment may include:  Observation. A responsible adult should stay with you for 24 hours after your injury and check on you often.  Physical rest.  Brain rest.  Pain medicines.  Severe head injury    If you have a severe head injury, treatment may include:  Close observation. This includes hospitalization with the following care:  Frequent physical exams.  Frequent checks of how your brain and nervous system are working (neurological status).  Checking your blood pressure and oxygen levels.  Medicines to relieve pain, prevent seizures, and decrease brain swelling.  Airway protection and breathing support. This may include using a ventilator.  Treatments that monitor and manage swelling inside the brain.  Brain surgery. This may be needed to:  Remove a collection of blood or blood clots.  Stop the bleeding.  Remove a part of the skull to allow room for the brain to swell.  Follow these instructions at home:  Activity    Rest and avoid activities that are physically hard or tiring.  Make sure you get enough sleep.  Let your brain rest by limiting activities that require a lot of thought or attention, such as:  Watching TV.  Playing memory games and puzzles.  Job-related work or homework.  Working on the computer, using social media, and texting.  Avoid activities that could cause another head injury, such as playing sports, until your health care provider approves. Having another head injury, especially before the first one has healed, can be dangerous.  Ask your health care provider when it is safe for you to return to your regular activities, including work or school. Ask your health care provider for a step-by-step plan for gradually returning to activities.  Ask your health care provider when you can drive, ride a bicycle, or use heavy machinery. Your ability to react may be slower after a brain injury. Do not do these activities if you are dizzy.  Lifestyle      Do not drink alcohol until your health care provider approves. Do not use drugs. Alcohol and certain drugs may slow your recovery and can put you at risk of further injury.  If it is harder than usual to remember things, write them down.  If you are easily distracted, try to do one thing at a time.  Talk with family members or close friends when making important decisions.  Tell your friends, family, a trusted colleague, and  about your injury, symptoms, and restrictions. Have them watch for any new or worsening problems.  General instructions    Take over-the-counter and prescription medicines only as told by your health care provider.  Have someone stay with you for 24 hours after your head injury. This person should watch you for any changes in your symptoms and be ready to seek medical help.  Keep all follow-up visits as told by your health care provider. This is important.  How is this prevented?  Work on improving your balance and strength to avoid falls.  Wear a seat belt when you are in a moving vehicle.  Wear a helmet when riding a bicycle, skiing, or doing any other sport or activity that has a risk of injury.  If you drink alcohol:  Limit how much you use to:  0–1 drink a day for nonpregnant women.  0–2 drinks a day for men.  Be aware of how much alcohol is in your drink. In the [place] one drink equals one 12 oz bottle of beer (355 mL), one 5 oz glass of wine (148 mL), or one 1½ oz glass of hard liquor (44 mL).  Take safety measures in your home, such as:  Removing clutter and tripping hazards from floors and stairways.  Using grab bars in bathrooms and handrails by stairs.  Placing non-slip mats on floors and in bathtubs.  Improving lighting in dim areas.  Where to find more information  Centers for Disease Control and Prevention: www.cdc.gov  Get help right away if:  You have:  A severe headache that is not helped by medicine.  Trouble walking or weakness in your arms and legs.  Clear or bloody fluid coming from your nose or ears.  Changes in your vision.  A seizure.  Increased confusion or irritability.  Your symptoms get worse.  You are sleepier than normal and have trouble staying awake.  You lose your balance.  Your pupils change size.  Your speech is slurred.  Your dizziness gets worse.  You vomit.  These symptoms may represent a serious problem that is an emergency. Do not wait to see if the symptoms will go away. Get medical help right away. Call your local emergency services (911 in the [place] Do not drive yourself to the hospital.    Summary  Head injuries can be minor, or they can be a serious medical issue requiring immediate attention.  Treatment for this condition depends on the severity and type of injury you have.  Have someone stay with you for 24 hours after your injury and check on you often.  Ask your health care provider when it is safe for you to return to your regular activities, including work or school.  Head injury prevention includes wearing a seat belt in a motor vehicle, using a helmet on a bicycle, limiting alcohol use, and taking safety measures in your home.

## 2023-11-13 PROBLEM — I10 ESSENTIAL (PRIMARY) HYPERTENSION: Chronic | Status: ACTIVE | Noted: 2023-08-09

## 2023-11-13 PROBLEM — E03.9 HYPOTHYROIDISM, UNSPECIFIED: Chronic | Status: ACTIVE | Noted: 2023-08-09

## 2023-11-13 PROBLEM — E78.5 HYPERLIPIDEMIA, UNSPECIFIED: Chronic | Status: ACTIVE | Noted: 2023-08-09

## 2023-11-13 PROBLEM — M19.90 UNSPECIFIED OSTEOARTHRITIS, UNSPECIFIED SITE: Chronic | Status: ACTIVE | Noted: 2023-08-09

## 2023-11-13 PROBLEM — F32.9 MAJOR DEPRESSIVE DISORDER, SINGLE EPISODE, UNSPECIFIED: Chronic | Status: ACTIVE | Noted: 2023-08-09
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
## 2023-11-14 DIAGNOSIS — S01.311A LACERATION WITHOUT FOREIGN BODY OF RIGHT EAR, INITIAL ENCOUNTER: ICD-10-CM

## 2023-11-14 DIAGNOSIS — Z88.2 ALLERGY STATUS TO SULFONAMIDES: ICD-10-CM

## 2023-11-14 DIAGNOSIS — Z96.649 PRESENCE OF UNSPECIFIED ARTIFICIAL HIP JOINT: ICD-10-CM

## 2023-11-14 DIAGNOSIS — Z88.0 ALLERGY STATUS TO PENICILLIN: ICD-10-CM

## 2023-11-14 DIAGNOSIS — S09.90XA UNSPECIFIED INJURY OF HEAD, INITIAL ENCOUNTER: ICD-10-CM

## 2023-11-14 DIAGNOSIS — Z79.01 LONG TERM (CURRENT) USE OF ANTICOAGULANTS: ICD-10-CM
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
## 2025-08-30 ENCOUNTER — EMERGENCY (EMERGENCY)
Facility: HOSPITAL | Age: 75
LOS: 1 days | End: 2025-08-30
Attending: EMERGENCY MEDICINE | Admitting: EMERGENCY MEDICINE
Payer: MEDICARE

## 2025-08-30 VITALS
OXYGEN SATURATION: 99 % | DIASTOLIC BLOOD PRESSURE: 76 MMHG | RESPIRATION RATE: 17 BRPM | HEART RATE: 93 BPM | WEIGHT: 110.01 LBS | TEMPERATURE: 98 F | SYSTOLIC BLOOD PRESSURE: 119 MMHG

## 2025-08-30 PROCEDURE — 99284 EMERGENCY DEPT VISIT MOD MDM: CPT

## 2025-08-30 PROCEDURE — 72192 CT PELVIS W/O DYE: CPT | Mod: 26

## 2025-08-30 RX ORDER — TRAMADOL HYDROCHLORIDE 50 MG/1
1 TABLET, FILM COATED ORAL
Qty: 28 | Refills: 0
Start: 2025-08-30 | End: 2025-09-05

## 2025-08-30 RX ORDER — TRAMADOL HYDROCHLORIDE 50 MG/1
50 TABLET, FILM COATED ORAL ONCE
Refills: 0 | Status: DISCONTINUED | OUTPATIENT
Start: 2025-08-30 | End: 2025-08-30

## 2025-08-30 RX ADMIN — TRAMADOL HYDROCHLORIDE 50 MILLIGRAM(S): 50 TABLET, FILM COATED ORAL at 10:43

## 2025-09-02 DIAGNOSIS — M25.552 PAIN IN LEFT HIP: ICD-10-CM

## 2025-09-02 DIAGNOSIS — Z88.2 ALLERGY STATUS TO SULFONAMIDES: ICD-10-CM

## 2025-09-02 DIAGNOSIS — S70.02XA CONTUSION OF LEFT HIP, INITIAL ENCOUNTER: ICD-10-CM
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
## 2025-09-02 DIAGNOSIS — Z88.0 ALLERGY STATUS TO PENICILLIN: ICD-10-CM
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
## 2025-09-02 DIAGNOSIS — Z96.642 PRESENCE OF LEFT ARTIFICIAL HIP JOINT: ICD-10-CM

## (undated) DEVICE — SUT STRATAFIX SPIRAL PDO 1 30CM CT-1

## (undated) DEVICE — GLV 8 PROTEXIS (WHITE)

## (undated) DEVICE — SAW BLADE STRYKER SAGITTAL 81.5X12.5X1.19MM

## (undated) DEVICE — DRAPE STERI-DRAPE INCISE 32X33"

## (undated) DEVICE — DRSG PREVENA PEEL & PLACE KIT 13CM

## (undated) DEVICE — SUT VICRYL 3-0 18" PS-1 UNDYED

## (undated) DEVICE — SUT STRATAFIX SPIRAL MONOCRYL PLUS 3-0 30CM PS-1 UNDYED

## (undated) DEVICE — GLV 8.5 PROTEXIS (WHITE)

## (undated) DEVICE — WARMING BLANKET UPPER ADULT

## (undated) DEVICE — GLV 8 PROTEXIS ORTHO (CREAM)

## (undated) DEVICE — WOUND IRR IRRISEPT W 0.5 CHG

## (undated) DEVICE — HOOD T5 PEELAWAY

## (undated) DEVICE — SUT STRATAFIX SPIRAL PDO 1 30CM OS-6

## (undated) DEVICE — Device

## (undated) DEVICE — HOOD FLYTE STRYKER HELMET SHIELD

## (undated) DEVICE — TIP FEMORAL CANAL

## (undated) DEVICE — SUT VICRYL 0 36" CT-1 UNDYED

## (undated) DEVICE — DRAPE LIGHT HANDLE COVER (BLUE)

## (undated) DEVICE — SUT STRATAFIX SPIRAL PDO 2-0 36CM MH

## (undated) DEVICE — SUT STRATAFIX SPIRAL MONOCRYL PLUS 2-0 36CM CT-1 UNDYED

## (undated) DEVICE — SUT ETHIBOND 1 30" OS8

## (undated) DEVICE — DRAPE C ARM UNIVERSAL

## (undated) DEVICE — SUT VICRYL PLUS 1 26" CT-1 UNDYED

## (undated) DEVICE — SUT VICRYL 2-0 27" CT-1 UNDYED

## (undated) DEVICE — PREP CHLORAPREP HI-LITE ORANGE 26ML

## (undated) DEVICE — SUT STRATAFIX SPIRAL PDO 0 24CM CP-2

## (undated) DEVICE — DRSG COBAN 6"

## (undated) DEVICE — ELCTR BOVIE PENCIL BLADE 10FT

## (undated) DEVICE — DRAPE BACK TABLE COVER 80X90"